# Patient Record
Sex: MALE | Race: WHITE | NOT HISPANIC OR LATINO | Employment: FULL TIME | URBAN - METROPOLITAN AREA
[De-identification: names, ages, dates, MRNs, and addresses within clinical notes are randomized per-mention and may not be internally consistent; named-entity substitution may affect disease eponyms.]

---

## 2017-05-01 ENCOUNTER — OFFICE VISIT (OUTPATIENT)
Dept: FAMILY MEDICINE CLINIC | Facility: CLINIC | Age: 52
End: 2017-05-01

## 2017-05-01 VITALS
TEMPERATURE: 97.7 F | HEART RATE: 72 BPM | DIASTOLIC BLOOD PRESSURE: 72 MMHG | OXYGEN SATURATION: 99 % | WEIGHT: 226.5 LBS | SYSTOLIC BLOOD PRESSURE: 102 MMHG | BODY MASS INDEX: 31.71 KG/M2 | HEIGHT: 71 IN

## 2017-05-01 DIAGNOSIS — Z00.00 HEALTH CARE MAINTENANCE: Primary | ICD-10-CM

## 2017-05-01 DIAGNOSIS — Z12.5 SCREENING PSA (PROSTATE SPECIFIC ANTIGEN): ICD-10-CM

## 2017-05-01 DIAGNOSIS — I10 ESSENTIAL HYPERTENSION: ICD-10-CM

## 2017-05-01 PROCEDURE — 99396 PREV VISIT EST AGE 40-64: CPT | Performed by: FAMILY MEDICINE

## 2017-05-01 PROCEDURE — 90715 TDAP VACCINE 7 YRS/> IM: CPT | Performed by: FAMILY MEDICINE

## 2017-05-01 PROCEDURE — 90471 IMMUNIZATION ADMIN: CPT | Performed by: FAMILY MEDICINE

## 2017-05-01 RX ORDER — LISINOPRIL 10 MG/1
10 TABLET ORAL DAILY
Qty: 90 TABLET | Refills: 3 | Status: SHIPPED | OUTPATIENT
Start: 2017-05-01 | End: 2017-10-30

## 2017-05-02 LAB
ALBUMIN SERPL-MCNC: 4.5 G/DL (ref 3.5–5.2)
ALBUMIN/GLOB SERPL: 1.7 G/DL
ALP SERPL-CCNC: 76 U/L (ref 39–117)
ALT SERPL-CCNC: 25 U/L (ref 1–41)
AST SERPL-CCNC: 22 U/L (ref 1–40)
BASOPHILS # BLD AUTO: 0.04 10*3/MM3 (ref 0–0.2)
BASOPHILS NFR BLD AUTO: 1.1 % (ref 0–1.5)
BILIRUB SERPL-MCNC: 1 MG/DL (ref 0.1–1.2)
BUN SERPL-MCNC: 20 MG/DL (ref 6–20)
BUN/CREAT SERPL: 16.3 (ref 7–25)
CALCIUM SERPL-MCNC: 9.8 MG/DL (ref 8.6–10.5)
CHLORIDE SERPL-SCNC: 102 MMOL/L (ref 98–107)
CHOLEST SERPL-MCNC: 191 MG/DL (ref 0–200)
CO2 SERPL-SCNC: 27.4 MMOL/L (ref 22–29)
CREAT SERPL-MCNC: 1.23 MG/DL (ref 0.76–1.27)
EOSINOPHIL # BLD AUTO: 0.14 10*3/MM3 (ref 0–0.7)
EOSINOPHIL NFR BLD AUTO: 3.7 % (ref 0.3–6.2)
ERYTHROCYTE [DISTWIDTH] IN BLOOD BY AUTOMATED COUNT: 14.1 % (ref 11.5–14.5)
GLOBULIN SER CALC-MCNC: 2.7 GM/DL
GLUCOSE SERPL-MCNC: 84 MG/DL (ref 65–99)
HBV SURFACE AB SER QL: REACTIVE
HCT VFR BLD AUTO: 45.9 % (ref 40.4–52.2)
HCV AB S/CO SERPL IA: 0.1 S/CO RATIO (ref 0–0.9)
HDLC SERPL-MCNC: 36 MG/DL (ref 40–60)
HGB BLD-MCNC: 15 G/DL (ref 13.7–17.6)
IMM GRANULOCYTES # BLD: 0 10*3/MM3 (ref 0–0.03)
IMM GRANULOCYTES NFR BLD: 0 % (ref 0–0.5)
LDLC SERPL CALC-MCNC: 131 MG/DL (ref 0–100)
LYMPHOCYTES # BLD AUTO: 1.37 10*3/MM3 (ref 0.9–4.8)
LYMPHOCYTES NFR BLD AUTO: 36.6 % (ref 19.6–45.3)
MCH RBC QN AUTO: 29.7 PG (ref 27–32.7)
MCHC RBC AUTO-ENTMCNC: 32.7 G/DL (ref 32.6–36.4)
MCV RBC AUTO: 90.9 FL (ref 79.8–96.2)
MONOCYTES # BLD AUTO: 0.29 10*3/MM3 (ref 0.2–1.2)
MONOCYTES NFR BLD AUTO: 7.8 % (ref 5–12)
NEUTROPHILS # BLD AUTO: 1.9 10*3/MM3 (ref 1.9–8.1)
NEUTROPHILS NFR BLD AUTO: 50.8 % (ref 42.7–76)
PLATELET # BLD AUTO: 132 10*3/MM3 (ref 140–500)
POTASSIUM SERPL-SCNC: 4.5 MMOL/L (ref 3.5–5.2)
PROT SERPL-MCNC: 7.2 G/DL (ref 6–8.5)
PSA SERPL-MCNC: 1.66 NG/ML (ref 0–4)
RBC # BLD AUTO: 5.05 10*6/MM3 (ref 4.6–6)
SODIUM SERPL-SCNC: 142 MMOL/L (ref 136–145)
TRIGL SERPL-MCNC: 120 MG/DL (ref 0–150)
VLDLC SERPL CALC-MCNC: 24 MG/DL (ref 5–40)
WBC # BLD AUTO: 3.74 10*3/MM3 (ref 4.5–10.7)

## 2017-10-26 DIAGNOSIS — I10 ESSENTIAL HYPERTENSION: ICD-10-CM

## 2017-10-26 DIAGNOSIS — E78.00 PURE HYPERCHOLESTEROLEMIA: Primary | ICD-10-CM

## 2017-10-30 RX ORDER — LISINOPRIL 10 MG/1
TABLET ORAL
Qty: 90 TABLET | Refills: 1 | Status: SHIPPED | OUTPATIENT
Start: 2017-10-30 | End: 2018-12-19 | Stop reason: SDUPTHER

## 2017-10-31 LAB
ALBUMIN SERPL-MCNC: 4.7 G/DL (ref 3.5–5.2)
ALBUMIN/GLOB SERPL: 2 G/DL
ALP SERPL-CCNC: 73 U/L (ref 39–117)
ALT SERPL-CCNC: 30 U/L (ref 1–41)
AST SERPL-CCNC: 25 U/L (ref 1–40)
BILIRUB SERPL-MCNC: 1.1 MG/DL (ref 0.1–1.2)
BUN SERPL-MCNC: 18 MG/DL (ref 6–20)
BUN/CREAT SERPL: 16.5 (ref 7–25)
CALCIUM SERPL-MCNC: 9.3 MG/DL (ref 8.6–10.5)
CHLORIDE SERPL-SCNC: 100 MMOL/L (ref 98–107)
CHOLEST SERPL-MCNC: 180 MG/DL (ref 0–200)
CO2 SERPL-SCNC: 26.9 MMOL/L (ref 22–29)
CREAT SERPL-MCNC: 1.09 MG/DL (ref 0.76–1.27)
GFR SERPLBLD CREATININE-BSD FMLA CKD-EPI: 72 ML/MIN/1.73
GFR SERPLBLD CREATININE-BSD FMLA CKD-EPI: 87 ML/MIN/1.73
GLOBULIN SER CALC-MCNC: 2.3 GM/DL
GLUCOSE SERPL-MCNC: 77 MG/DL (ref 65–99)
HDLC SERPL-MCNC: 41 MG/DL (ref 40–60)
LDLC SERPL CALC-MCNC: 121 MG/DL (ref 0–100)
LDLC/HDLC SERPL: 2.96 {RATIO}
POTASSIUM SERPL-SCNC: 4.4 MMOL/L (ref 3.5–5.2)
PROT SERPL-MCNC: 7 G/DL (ref 6–8.5)
SODIUM SERPL-SCNC: 142 MMOL/L (ref 136–145)
TRIGL SERPL-MCNC: 89 MG/DL (ref 0–150)
VLDLC SERPL CALC-MCNC: 17.8 MG/DL (ref 5–40)

## 2017-11-06 ENCOUNTER — OFFICE VISIT (OUTPATIENT)
Dept: FAMILY MEDICINE CLINIC | Facility: CLINIC | Age: 52
End: 2017-11-06

## 2017-11-06 VITALS
OXYGEN SATURATION: 98 % | HEART RATE: 76 BPM | HEIGHT: 71 IN | BODY MASS INDEX: 29.54 KG/M2 | DIASTOLIC BLOOD PRESSURE: 66 MMHG | WEIGHT: 211 LBS | TEMPERATURE: 97.8 F | SYSTOLIC BLOOD PRESSURE: 112 MMHG

## 2017-11-06 DIAGNOSIS — I10 ESSENTIAL HYPERTENSION: Primary | ICD-10-CM

## 2017-11-06 PROCEDURE — 99213 OFFICE O/P EST LOW 20 MIN: CPT | Performed by: FAMILY MEDICINE

## 2017-11-06 NOTE — PROGRESS NOTES
"Subjective   Jose Montanez is a 51 y.o. male.     Chief Complaint   Patient presents with   • Hypertension     follow up labs        History of Present Illness    Hypertension.  Follow-up.  Patient is doing great.  He's cut way back on carbohydrates and sugars.  He is losing weight.  Not exercising much.  Overall is lost about 25 pounds since last year.  He has had no hypotensive episodes.  No orthostasis.  He continues lisinopril 10 mg a day.  No dry cough.  His lipid panel has improved.  LDL is down.  Blood sugar is down.      The following portions of the patient's history were reviewed and updated as appropriate: allergies, current medications, past family history, past medical history, past social history, past surgical history and problem list.          Review of Systems   Constitutional: Negative.  Negative for unexpected weight change.   Respiratory: Negative.    Cardiovascular: Negative.    Musculoskeletal: Negative.    Neurological: Negative.    Psychiatric/Behavioral: Negative.        Objective   Blood pressure 112/66, pulse 76, temperature 97.8 °F (36.6 °C), temperature source Oral, height 71\" (180.3 cm), weight 211 lb (95.7 kg), SpO2 98 %.  Physical Exam   Constitutional: He appears well-developed and well-nourished. No distress.   Neck: No thyromegaly present.   Cardiovascular: Normal rate, regular rhythm, normal heart sounds and intact distal pulses.    Pulmonary/Chest: Effort normal and breath sounds normal.   Musculoskeletal: He exhibits no edema.   Skin: Skin is warm and dry.   Psychiatric: He has a normal mood and affect. His behavior is normal. Judgment and thought content normal.   Nursing note and vitals reviewed.      Assessment/Plan   Jose was seen today for hypertension.    Diagnoses and all orders for this visit:    Essential hypertension      Hypertension.  Well-controlled.  He's been losing weight.  For now continue lisinopril 10 mg a day.  His refills are up-to-date.  If he " starts having lower blood pressure symptoms, we will cut down to 5 mg a day.  Weight loss has been encouraged.  I do recommend increased exercise.    Discussed colorectal cancer screening.  He had a stool DNA test which came back negative reportedly.  We still discussed colonoscopy.  When he is ready for referral I can assist.    Follow-up in 6 months.  For clear physical examination.  Needs CMP, CBC, lipid panel, PSA prior to that visit

## 2018-12-20 RX ORDER — LISINOPRIL 10 MG/1
TABLET ORAL
Qty: 90 TABLET | Refills: 1 | Status: SHIPPED | OUTPATIENT
Start: 2018-12-20 | End: 2019-07-08 | Stop reason: SDUPTHER

## 2019-07-01 DIAGNOSIS — I10 ESSENTIAL HYPERTENSION: ICD-10-CM

## 2019-07-01 DIAGNOSIS — Z12.5 SCREENING FOR PROSTATE CANCER: ICD-10-CM

## 2019-07-01 DIAGNOSIS — Z00.00 HEALTH CARE MAINTENANCE: ICD-10-CM

## 2019-07-01 DIAGNOSIS — E78.00 PURE HYPERCHOLESTEROLEMIA: Primary | ICD-10-CM

## 2019-07-08 ENCOUNTER — OFFICE VISIT (OUTPATIENT)
Dept: FAMILY MEDICINE CLINIC | Facility: CLINIC | Age: 54
End: 2019-07-08

## 2019-07-08 VITALS
TEMPERATURE: 96.8 F | HEIGHT: 71 IN | BODY MASS INDEX: 23.03 KG/M2 | SYSTOLIC BLOOD PRESSURE: 143 MMHG | HEART RATE: 63 BPM | DIASTOLIC BLOOD PRESSURE: 103 MMHG | OXYGEN SATURATION: 97 % | WEIGHT: 164.5 LBS

## 2019-07-08 DIAGNOSIS — Z12.11 ENCOUNTER FOR SCREENING COLONOSCOPY: ICD-10-CM

## 2019-07-08 DIAGNOSIS — Z00.00 HEALTH CARE MAINTENANCE: Primary | ICD-10-CM

## 2019-07-08 PROCEDURE — 99396 PREV VISIT EST AGE 40-64: CPT | Performed by: FAMILY MEDICINE

## 2019-07-08 RX ORDER — RANITIDINE 300 MG/1
300 TABLET ORAL NIGHTLY
COMMUNITY
End: 2021-06-07

## 2019-07-08 RX ORDER — LISINOPRIL 10 MG/1
10 TABLET ORAL DAILY
Qty: 90 TABLET | Refills: 3 | Status: SHIPPED | OUTPATIENT
Start: 2019-07-08 | End: 2020-07-27 | Stop reason: SDUPTHER

## 2019-07-08 NOTE — PROGRESS NOTES
Subjective   Jose Montanez is a 53 y.o. male.     Annual Exam    History of Present Illness    Hypertension follow up.  Today's BP: (!) 143/103.  Patient has been checking his blood pressure at home regularly.  About 120 systolic on average.  He ran out of his lisinopril a few days ago.  Blood pressure is elevated today and he ran up here.    He is exercising about once a week on his exercise bicycle.  Minimal alcohol use, maybe once a week.  No drug use.  Negative depression screen.    He has a inguinal hernia for some time.  He said about having it worked on.  He needs a screening colonoscopy.  He had a negative Cologuard a couple of years ago.  There is no family history of colon cancer.  His father has coronary artery disease medication management.  The patient's ten-year risk of atherosclerotic coronary vascular disease is approximately 5%.    Last lipid panel:   Lab Results   Component Value Date    HDL 41 10/30/2017     Lab Results   Component Value Date     (H) 10/30/2017     Lab Results   Component Value Date    TRIG 89 10/30/2017     Social History     Tobacco Use   • Smoking status: Never Smoker   • Smokeless tobacco: Never Used   Substance Use Topics   • Alcohol use: Yes   • Drug use: No     Immunization History   Administered Date(s) Administered   • Tdap 05/01/2017     Family History   Problem Relation Age of Onset   • Arthritis Mother    • Diverticulitis Mother    • Hypertension Father    • Coronary artery disease Father         med managment       The following portions of the patient's history were reviewed and updated as appropriate: allergies, current medications, past family history, past medical history, past social history, past surgical history and problem list.      Review of Systems   Constitutional: Negative.    HENT: Negative.    Respiratory: Negative.    Cardiovascular: Negative.    Gastrointestinal: Negative.  Negative for blood in stool.   Endocrine: Negative.   "  Genitourinary: Negative.  Negative for hematuria.   Musculoskeletal: Negative.    Skin: Negative.    Neurological: Negative.  Negative for headaches.   Psychiatric/Behavioral: Negative.    All other systems reviewed and are negative.      Objective   Blood pressure (!) 143/103, pulse 63, temperature 96.8 °F (36 °C), temperature source Oral, height 180.3 cm (70.98\"), weight 74.6 kg (164 lb 8 oz), SpO2 97 %.  Physical Exam   Constitutional: He is oriented to person, place, and time. He appears well-developed and well-nourished. No distress.   HENT:   Head: Atraumatic.   Right Ear: Tympanic membrane, external ear and ear canal normal.   Left Ear: Tympanic membrane, external ear and ear canal normal.   Nose: Nose normal.   Mouth/Throat: Oropharynx is clear and moist. No oropharyngeal exudate.   Eyes: Conjunctivae are normal. Right eye exhibits no discharge. Left eye exhibits no discharge. No scleral icterus.   Neck: Normal range of motion. Neck supple. No thyromegaly present.   Cardiovascular: Normal rate, regular rhythm, normal heart sounds and intact distal pulses.   Pulmonary/Chest: Effort normal and breath sounds normal. No respiratory distress. He has no wheezes. He has no rales.   Abdominal: Soft. Bowel sounds are normal. He exhibits no distension. There is no tenderness.   Musculoskeletal: He exhibits no edema.   Lymphadenopathy:     He has no cervical adenopathy.   Neurological: He is alert and oriented to person, place, and time. He exhibits normal muscle tone. Coordination normal.   Skin: Skin is warm and dry. No rash noted.   Psychiatric: He has a normal mood and affect. His behavior is normal. Judgment and thought content normal.   Nursing note and vitals reviewed.      Assessment/Plan   Jose was seen today for annual exam.    Diagnoses and all orders for this visit:    Health care maintenance    Encounter for screening colonoscopy  -     Ambulatory Referral For Screening Colonoscopy    Other " orders  -     lisinopril (PRINIVIL,ZESTRIL) 10 MG tablet; Take 1 tablet by mouth Daily.      Annual wellness visit.    Immunizations.  Tdap up-to-date.    Colon cancer screening.  I recommend a screening colonoscopy.  He had a negative Cologuard a couple of years ago.  Benefits and risk of colonoscopy discussed.    Prostate cancer screening.  Check PSA.  He has a doctor who checks his prostate.    Hypertension.  Good control at home.  Continue lisinopril.  I refilled it.  It is high today because he has not been taking his medicine for a few days.  He will continue to monitor his blood pressure at home, he states he checks daily.    Health maintenance and safety issues discussed.  I recommend regular exercise.  We discussed mindfulness meditation.  We discussed dieting.  Recommend weight watchers.  I will see him back in 1 year, sooner as needed.  Lab work ordered today.    Cardiovascular risk screening.  Based on 2017 his ten-year risk of coronary artery disease is approximately 5%.  If his risk goes up, to consider CT CAC scoring if he is of intermediate risk.  If at high risk recommend statin use.

## 2019-07-09 DIAGNOSIS — E78.00 PURE HYPERCHOLESTEROLEMIA: Primary | ICD-10-CM

## 2019-07-09 LAB
ALBUMIN SERPL-MCNC: 4.5 G/DL (ref 3.5–5.2)
ALBUMIN/GLOB SERPL: 2 G/DL
ALP SERPL-CCNC: 73 U/L (ref 39–117)
ALT SERPL-CCNC: 22 U/L (ref 1–41)
APPEARANCE UR: CLEAR
AST SERPL-CCNC: 22 U/L (ref 1–40)
BASOPHILS # BLD AUTO: 0.05 10*3/MM3 (ref 0–0.2)
BASOPHILS NFR BLD AUTO: 1 % (ref 0–1.5)
BILIRUB SERPL-MCNC: 1 MG/DL (ref 0.2–1.2)
BILIRUB UR QL STRIP: NEGATIVE
BUN SERPL-MCNC: 23 MG/DL (ref 6–20)
BUN/CREAT SERPL: 18.3 (ref 7–25)
CALCIUM SERPL-MCNC: 9.4 MG/DL (ref 8.6–10.5)
CHLORIDE SERPL-SCNC: 101 MMOL/L (ref 98–107)
CHOLEST SERPL-MCNC: 202 MG/DL (ref 0–200)
CO2 SERPL-SCNC: 28.6 MMOL/L (ref 22–29)
COLOR UR: YELLOW
CREAT SERPL-MCNC: 1.26 MG/DL (ref 0.76–1.27)
EOSINOPHIL # BLD AUTO: 0.27 10*3/MM3 (ref 0–0.4)
EOSINOPHIL NFR BLD AUTO: 5.6 % (ref 0.3–6.2)
ERYTHROCYTE [DISTWIDTH] IN BLOOD BY AUTOMATED COUNT: 13.2 % (ref 12.3–15.4)
GLOBULIN SER CALC-MCNC: 2.3 GM/DL
GLUCOSE SERPL-MCNC: 84 MG/DL (ref 65–99)
GLUCOSE UR QL: NEGATIVE
HCT VFR BLD AUTO: 49.9 % (ref 37.5–51)
HDLC SERPL-MCNC: 44 MG/DL (ref 40–60)
HGB BLD-MCNC: 15.9 G/DL (ref 13–17.7)
HGB UR QL STRIP: NEGATIVE
IMM GRANULOCYTES # BLD AUTO: 0.05 10*3/MM3 (ref 0–0.05)
IMM GRANULOCYTES NFR BLD AUTO: 1 % (ref 0–0.5)
KETONES UR QL STRIP: NEGATIVE
LDLC SERPL CALC-MCNC: 143 MG/DL (ref 0–100)
LEUKOCYTE ESTERASE UR QL STRIP: NEGATIVE
LYMPHOCYTES # BLD AUTO: 1.29 10*3/MM3 (ref 0.7–3.1)
LYMPHOCYTES NFR BLD AUTO: 27 % (ref 19.6–45.3)
MCH RBC QN AUTO: 28.6 PG (ref 26.6–33)
MCHC RBC AUTO-ENTMCNC: 31.9 G/DL (ref 31.5–35.7)
MCV RBC AUTO: 89.9 FL (ref 79–97)
MONOCYTES # BLD AUTO: 0.44 10*3/MM3 (ref 0.1–0.9)
MONOCYTES NFR BLD AUTO: 9.2 % (ref 5–12)
NEUTROPHILS # BLD AUTO: 2.68 10*3/MM3 (ref 1.7–7)
NEUTROPHILS NFR BLD AUTO: 56.2 % (ref 42.7–76)
NITRITE UR QL STRIP: NEGATIVE
NRBC BLD AUTO-RTO: 0 /100 WBC (ref 0–0.2)
PH UR STRIP: 6 [PH] (ref 5–8)
PLATELET # BLD AUTO: 139 10*3/MM3 (ref 140–450)
POTASSIUM SERPL-SCNC: 4.6 MMOL/L (ref 3.5–5.2)
PROT SERPL-MCNC: 6.8 G/DL (ref 6–8.5)
PROT UR QL STRIP: NEGATIVE
PSA SERPL-MCNC: 1.53 NG/ML (ref 0–4)
RBC # BLD AUTO: 5.55 10*6/MM3 (ref 4.14–5.8)
SODIUM SERPL-SCNC: 140 MMOL/L (ref 136–145)
SP GR UR: 1.02 (ref 1–1.03)
TRIGL SERPL-MCNC: 75 MG/DL (ref 0–150)
UROBILINOGEN UR STRIP-MCNC: NORMAL MG/DL
VLDLC SERPL CALC-MCNC: 15 MG/DL
WBC # BLD AUTO: 4.78 10*3/MM3 (ref 3.4–10.8)

## 2019-07-09 NOTE — PROGRESS NOTES
Please call patient.  The LDL cholesterol is higher.  The ten-year risk of coronary artery disease is 7 % or just at the intermediate risk.  Per my conversation with him yesterday, I recommend a CT coronary artery calcium score.  I have ordered it.  The remainder of the blood work looks good including the PSA level

## 2019-07-10 ENCOUNTER — HOSPITAL ENCOUNTER (OUTPATIENT)
Dept: CT IMAGING | Facility: HOSPITAL | Age: 54
Discharge: HOME | End: 2019-07-10
Admitting: FAMILY MEDICINE

## 2019-07-10 DIAGNOSIS — E78.00 PURE HYPERCHOLESTEROLEMIA: ICD-10-CM

## 2019-07-10 PROCEDURE — 75571 CT HRT W/O DYE W/CA TEST: CPT

## 2019-07-12 NOTE — PROGRESS NOTES
Please call patient let him know the CT CAC score was 0.  No evidence of atherosclerosis.  No need for medication.

## 2019-12-05 ENCOUNTER — TELEPHONE (OUTPATIENT)
Dept: CARDIOLOGY | Facility: CLINIC | Age: 54
End: 2019-12-05

## 2019-12-05 NOTE — TELEPHONE ENCOUNTER
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  He is wanting to be established with Dr Anthony again.  Patient has not seen Dr Anthony in 10+ years.  Is it okay to put him on the schedule and if so where.  Thank you,  Janae

## 2019-12-09 NOTE — TELEPHONE ENCOUNTER
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  He can do Monday mornings, Thursday or Friday afternoon's after 1:00 pm or so.  Please advise.  Thank you,  Janae

## 2019-12-09 NOTE — TELEPHONE ENCOUNTER
Janae I called the patient can you add him on as a New Patient on 1/3/2020 at 3:40  Been 10+ years since he has been here.

## 2019-12-28 ENCOUNTER — HOSPITAL ENCOUNTER (OUTPATIENT)
Dept: GENERAL RADIOLOGY | Facility: HOSPITAL | Age: 54
Discharge: HOME | End: 2019-12-28
Admitting: UROLOGY

## 2019-12-28 DIAGNOSIS — M79.671 HEEL PAIN, CHRONIC, RIGHT: ICD-10-CM

## 2019-12-28 DIAGNOSIS — G89.29 HEEL PAIN, CHRONIC, RIGHT: ICD-10-CM

## 2019-12-28 DIAGNOSIS — M79.671 HEEL PAIN, CHRONIC, RIGHT: Primary | ICD-10-CM

## 2019-12-28 DIAGNOSIS — G89.29 HEEL PAIN, CHRONIC, RIGHT: Primary | ICD-10-CM

## 2019-12-28 PROCEDURE — 73620 X-RAY EXAM OF FOOT: CPT

## 2019-12-28 PROCEDURE — 73630 X-RAY EXAM OF FOOT: CPT

## 2020-01-29 ENCOUNTER — OFFICE VISIT (OUTPATIENT)
Dept: CARDIOLOGY | Facility: CLINIC | Age: 55
End: 2020-01-29

## 2020-01-29 VITALS
BODY MASS INDEX: 32.47 KG/M2 | SYSTOLIC BLOOD PRESSURE: 138 MMHG | WEIGHT: 226.8 LBS | HEIGHT: 70 IN | HEART RATE: 78 BPM | DIASTOLIC BLOOD PRESSURE: 86 MMHG

## 2020-01-29 DIAGNOSIS — I10 ESSENTIAL HYPERTENSION: Primary | ICD-10-CM

## 2020-01-29 DIAGNOSIS — I49.3 PVC (PREMATURE VENTRICULAR CONTRACTION): ICD-10-CM

## 2020-01-29 DIAGNOSIS — E66.09 CLASS 1 OBESITY DUE TO EXCESS CALORIES WITHOUT SERIOUS COMORBIDITY WITH BODY MASS INDEX (BMI) OF 32.0 TO 32.9 IN ADULT: ICD-10-CM

## 2020-01-29 PROCEDURE — 93000 ELECTROCARDIOGRAM COMPLETE: CPT | Performed by: INTERNAL MEDICINE

## 2020-01-29 PROCEDURE — 99203 OFFICE O/P NEW LOW 30 MIN: CPT | Performed by: INTERNAL MEDICINE

## 2020-01-29 NOTE — PROGRESS NOTES
Date of Office Visit: 20  Encounter Provider: Jose C Anthony MD  Place of Service: [facility] CARDIOLOGY  Patient Name: Jose Montanez  :1965  [id number]    Chief Complaint   Patient presents with   • Establish Care   :     HPI: Jose Montanez is a 54 y.o. male he is here to see us for PVCs.  He had a ablation on a vein in his leg and was on a heart monitor and noticed he was having PVCs he does not really feel but he wanted to come get checked out.  He has not had any chest pain shortness of breath PND orthopnea or edema.  He saw his primary care doctor did a calcium score on him that was 0.  He does have a history of hypertension he is got some allergies and he takes Zyrtec with Sudafed.  He has sleep apnea and is not been wearing his mask.  He is able to exercise but he is a bad knee but he rides his bike and feels well while he is doing that without symptoms.  There is no history of cardiomyopathies or sudden death in the family.  He rarely drinks alcohol he does drink pretty big caffeine about 3 times a week    Past Medical History:   Diagnosis Date   • Allergic    • Hypertension        Past Surgical History:   Procedure Laterality Date   • ADENOIDECTOMY     • BACK SURGERY     • SEPTOPLASTY     • TONSILLECTOMY         Social History     Socioeconomic History   • Marital status:      Spouse name: Not on file   • Number of children: Not on file   • Years of education: Not on file   • Highest education level: Not on file   Tobacco Use   • Smoking status: Never Smoker   • Smokeless tobacco: Never Used   Substance and Sexual Activity   • Alcohol use: Yes   • Drug use: No       Family History   Problem Relation Age of Onset   • Arthritis Mother    • Diverticulitis Mother    • Hypertension Father    • Coronary artery disease Father         med managment       Review of Systems   Constitution: Negative for decreased appetite, fever, malaise/fatigue and  "weight loss.   HENT: Negative for nosebleeds.    Eyes: Negative for double vision.   Cardiovascular: Negative for chest pain, claudication, cyanosis, dyspnea on exertion, irregular heartbeat, leg swelling, near-syncope, orthopnea, palpitations, paroxysmal nocturnal dyspnea and syncope.   Respiratory: Negative for cough, hemoptysis and shortness of breath.    Hematologic/Lymphatic: Negative for bleeding problem.   Skin: Negative for rash.   Musculoskeletal: Negative for falls and myalgias.   Gastrointestinal: Negative for hematochezia, jaundice, melena, nausea and vomiting.   Genitourinary: Negative for hematuria.   Neurological: Negative for dizziness and seizures.   Psychiatric/Behavioral: Negative for altered mental status and memory loss.       No Known Allergies      Current Outpatient Medications:   •  azelastine (OPTIVAR) 0.05 % ophthalmic solution, Apply  to eye., Disp: , Rfl:   •  cetirizine-pseudoephedrine (ZYRTEC-D ALLERGY & CONGESTION) 5-120 MG per 12 hr tablet, Take  by mouth., Disp: , Rfl:   •  lisinopril (PRINIVIL,ZESTRIL) 10 MG tablet, Take 1 tablet by mouth Daily., Disp: 90 tablet, Rfl: 3  •  Multiple Vitamins-Minerals (MULTIVITAL PO), Take 1 tablet by mouth Daily., Disp: , Rfl:   •  raNITIdine (ZANTAC) 300 MG tablet, Take 300 mg by mouth Every Night., Disp: , Rfl:       Objective:     Vitals:    01/29/20 0929 01/29/20 0932   BP: 140/90 138/86   BP Location: Right arm Left arm   Patient Position: Sitting Sitting   Pulse: 78    Weight: 103 kg (226 lb 12.8 oz)    Height: 177.8 cm (70\")      Body mass index is 32.54 kg/m².    Physical Exam   Constitutional: He is oriented to person, place, and time. He appears well-developed and well-nourished.   HENT:   Head: Normocephalic.   Eyes: No scleral icterus.   Neck: No JVD present. No thyromegaly present.   Cardiovascular: Normal rate, regular rhythm and normal heart sounds. Exam reveals no gallop and no friction rub.   No murmur heard.  Pulmonary/Chest: " Effort normal and breath sounds normal. He has no wheezes. He has no rales.   Abdominal: Soft. There is no hepatosplenomegaly. There is no tenderness.   Musculoskeletal: Normal range of motion. He exhibits no edema.   Lymphadenopathy:     He has no cervical adenopathy.   Neurological: He is alert and oriented to person, place, and time.   Skin: Skin is warm and dry. No rash noted.   Psychiatric: He has a normal mood and affect.         ECG 12 Lead  Date/Time: 1/29/2020 10:06 AM  Performed by: Jose C Anthony MD  Authorized by: Joes C Anthony MD   Comparison: compared with previous ECG   Similar to previous ECG  Rhythm: sinus rhythm  Ectopy: unifocal PVCs    Clinical impression: abnormal EKG             Assessment:       Diagnosis Plan   1. Essential hypertension     2. PVC (premature ventricular contraction)     3. Class 1 obesity due to excess calories without serious comorbidity with body mass index (BMI) of 32.0 to 32.9 in adult            Plan:       So I think that it is I doubt there is anything sinister going on but he does have PVCs I think it warrants a little bit of an evaluation I like to see how many he is having during the day and I would also like to get an echo on him to make sure that his underlying structural heart is good and that he has not developed a cardiomyopathy I do not think he has.  I have counseled him to drop his Sudafed from his regimen and see if that helps and also to get his sleep apnea treated and see if that helps I am not inclined to treat him unless he is having thousands and thousands of PVCs or having an issue with his LV function.  I spent a long time counseling him on diet I think he needs to bring his weight down    As always, it has been a pleasure to participate in your patient's care.      Sincerely,       Jose C Anthony MD

## 2020-02-20 ENCOUNTER — HOSPITAL ENCOUNTER (OUTPATIENT)
Dept: CARDIOLOGY | Facility: HOSPITAL | Age: 55
Discharge: HOME | End: 2020-02-20
Admitting: INTERNAL MEDICINE

## 2020-02-20 VITALS
BODY MASS INDEX: 32.35 KG/M2 | HEART RATE: 78 BPM | HEIGHT: 70 IN | SYSTOLIC BLOOD PRESSURE: 143 MMHG | WEIGHT: 226 LBS | DIASTOLIC BLOOD PRESSURE: 92 MMHG | OXYGEN SATURATION: 98 %

## 2020-02-20 DIAGNOSIS — I10 ESSENTIAL HYPERTENSION: ICD-10-CM

## 2020-02-20 DIAGNOSIS — I49.3 PVC (PREMATURE VENTRICULAR CONTRACTION): ICD-10-CM

## 2020-02-20 DIAGNOSIS — E66.09 CLASS 1 OBESITY DUE TO EXCESS CALORIES WITHOUT SERIOUS COMORBIDITY WITH BODY MASS INDEX (BMI) OF 32.0 TO 32.9 IN ADULT: ICD-10-CM

## 2020-02-20 LAB
AORTIC ARCH: 3 CM
AORTIC ROOT ANNULUS: 2.2 CM
ASCENDING AORTA: 3.2 CM
BH CV ECHO MEAS - ACS: 2.2 CM
BH CV ECHO MEAS - AO MAX PG (FULL): 6.4 MMHG
BH CV ECHO MEAS - AO MAX PG: 10 MMHG
BH CV ECHO MEAS - AO MEAN PG (FULL): 4 MMHG
BH CV ECHO MEAS - AO MEAN PG: 6 MMHG
BH CV ECHO MEAS - AO ROOT AREA (BSA CORRECTED): 1.5
BH CV ECHO MEAS - AO ROOT AREA: 9.1 CM^2
BH CV ECHO MEAS - AO ROOT DIAM: 3.4 CM
BH CV ECHO MEAS - AO V2 MAX: 158 CM/SEC
BH CV ECHO MEAS - AO V2 MEAN: 118 CM/SEC
BH CV ECHO MEAS - AO V2 VTI: 33.9 CM
BH CV ECHO MEAS - ASC AORTA: 3.2 CM
BH CV ECHO MEAS - AVA(I,A): 2.1 CM^2
BH CV ECHO MEAS - AVA(I,D): 2.1 CM^2
BH CV ECHO MEAS - AVA(V,A): 2.1 CM^2
BH CV ECHO MEAS - AVA(V,D): 2.1 CM^2
BH CV ECHO MEAS - BSA(HAYCOCK): 2.3 M^2
BH CV ECHO MEAS - BSA: 2.2 M^2
BH CV ECHO MEAS - BZI_BMI: 32.4 KILOGRAMS/M^2
BH CV ECHO MEAS - BZI_METRIC_HEIGHT: 177.8 CM
BH CV ECHO MEAS - BZI_METRIC_WEIGHT: 102.5 KG
BH CV ECHO MEAS - EDV(MOD-SP2): 87 ML
BH CV ECHO MEAS - EDV(MOD-SP4): 90 ML
BH CV ECHO MEAS - EDV(TEICH): 129.5 ML
BH CV ECHO MEAS - EF(CUBED): 66.8 %
BH CV ECHO MEAS - EF(MOD-BP): 60 %
BH CV ECHO MEAS - EF(MOD-SP2): 66.7 %
BH CV ECHO MEAS - EF(MOD-SP4): 55.6 %
BH CV ECHO MEAS - EF(TEICH): 58 %
BH CV ECHO MEAS - ESV(MOD-SP2): 29 ML
BH CV ECHO MEAS - ESV(MOD-SP4): 40 ML
BH CV ECHO MEAS - ESV(TEICH): 54.4 ML
BH CV ECHO MEAS - FS: 30.8 %
BH CV ECHO MEAS - IVS/LVPW: 1.1
BH CV ECHO MEAS - IVSD: 1 CM
BH CV ECHO MEAS - LAT PEAK E' VEL: 8 CM/SEC
BH CV ECHO MEAS - LV DIASTOLIC VOL/BSA (35-75): 40.9 ML/M^2
BH CV ECHO MEAS - LV MASS(C)D: 181.4 GRAMS
BH CV ECHO MEAS - LV MASS(C)DI: 82.5 GRAMS/M^2
BH CV ECHO MEAS - LV MAX PG: 3.6 MMHG
BH CV ECHO MEAS - LV MEAN PG: 2 MMHG
BH CV ECHO MEAS - LV SYSTOLIC VOL/BSA (12-30): 18.2 ML/M^2
BH CV ECHO MEAS - LV V1 MAX: 94.8 CM/SEC
BH CV ECHO MEAS - LV V1 MEAN: 75.5 CM/SEC
BH CV ECHO MEAS - LV V1 VTI: 21 CM
BH CV ECHO MEAS - LVIDD: 5.2 CM
BH CV ECHO MEAS - LVIDS: 3.6 CM
BH CV ECHO MEAS - LVLD AP2: 7.9 CM
BH CV ECHO MEAS - LVLD AP4: 7.9 CM
BH CV ECHO MEAS - LVLS AP2: 5.9 CM
BH CV ECHO MEAS - LVLS AP4: 6.2 CM
BH CV ECHO MEAS - LVOT AREA (M): 3.5 CM^2
BH CV ECHO MEAS - LVOT AREA: 3.5 CM^2
BH CV ECHO MEAS - LVOT DIAM: 2.1 CM
BH CV ECHO MEAS - LVPWD: 0.9 CM
BH CV ECHO MEAS - MED PEAK E' VEL: 8 CM/SEC
BH CV ECHO MEAS - MV A DUR: 0.07 SEC
BH CV ECHO MEAS - MV A MAX VEL: 72.2 CM/SEC
BH CV ECHO MEAS - MV DEC SLOPE: 685 CM/SEC^2
BH CV ECHO MEAS - MV DEC TIME: 0.11 SEC
BH CV ECHO MEAS - MV E MAX VEL: 63.4 CM/SEC
BH CV ECHO MEAS - MV E/A: 0.88
BH CV ECHO MEAS - MV MAX PG: 2.5 MMHG
BH CV ECHO MEAS - MV MEAN PG: 2 MMHG
BH CV ECHO MEAS - MV P1/2T MAX VEL: 84.4 CM/SEC
BH CV ECHO MEAS - MV P1/2T: 36.1 MSEC
BH CV ECHO MEAS - MV V2 MAX: 79.7 CM/SEC
BH CV ECHO MEAS - MV V2 MEAN: 61.1 CM/SEC
BH CV ECHO MEAS - MV V2 VTI: 21.6 CM
BH CV ECHO MEAS - MVA P1/2T LCG: 2.6 CM^2
BH CV ECHO MEAS - MVA(P1/2T): 6.1 CM^2
BH CV ECHO MEAS - MVA(VTI): 3.4 CM^2
BH CV ECHO MEAS - PA ACC TIME: 0.06 SEC
BH CV ECHO MEAS - PA MAX PG (FULL): 2.9 MMHG
BH CV ECHO MEAS - PA MAX PG: 4.8 MMHG
BH CV ECHO MEAS - PA PR(ACCEL): 51.6 MMHG
BH CV ECHO MEAS - PA V2 MAX: 110 CM/SEC
BH CV ECHO MEAS - PULM A REVS DUR: 0.08 SEC
BH CV ECHO MEAS - PULM A REVS VEL: 34.3 CM/SEC
BH CV ECHO MEAS - PULM DIAS VEL: 54.8 CM/SEC
BH CV ECHO MEAS - PULM S/D: 1.1
BH CV ECHO MEAS - PULM SYS VEL: 60 CM/SEC
BH CV ECHO MEAS - PVA(V,A): 3.1 CM^2
BH CV ECHO MEAS - PVA(V,D): 3.1 CM^2
BH CV ECHO MEAS - QP/QS: 1.2
BH CV ECHO MEAS - RAP SYSTOLE: 3 MMHG
BH CV ECHO MEAS - RV MAX PG: 1.9 MMHG
BH CV ECHO MEAS - RV MEAN PG: 1 MMHG
BH CV ECHO MEAS - RV V1 MAX: 69.8 CM/SEC
BH CV ECHO MEAS - RV V1 MEAN: 52.2 CM/SEC
BH CV ECHO MEAS - RV V1 VTI: 17.5 CM
BH CV ECHO MEAS - RVOT AREA: 4.9 CM^2
BH CV ECHO MEAS - RVOT DIAM: 2.5 CM
BH CV ECHO MEAS - RVSP: 22 MMHG
BH CV ECHO MEAS - SI(AO): 140 ML/M^2
BH CV ECHO MEAS - SI(CUBED): 42.7 ML/M^2
BH CV ECHO MEAS - SI(LVOT): 33.1 ML/M^2
BH CV ECHO MEAS - SI(MOD-SP2): 26.4 ML/M^2
BH CV ECHO MEAS - SI(MOD-SP4): 22.7 ML/M^2
BH CV ECHO MEAS - SI(TEICH): 34.1 ML/M^2
BH CV ECHO MEAS - SUP REN AO DIAM: 2.8 CM
BH CV ECHO MEAS - SV(AO): 307.8 ML
BH CV ECHO MEAS - SV(CUBED): 94 ML
BH CV ECHO MEAS - SV(LVOT): 72.7 ML
BH CV ECHO MEAS - SV(MOD-SP2): 58 ML
BH CV ECHO MEAS - SV(MOD-SP4): 50 ML
BH CV ECHO MEAS - SV(RVOT): 85.9 ML
BH CV ECHO MEAS - SV(TEICH): 75.1 ML
BH CV ECHO MEAS - TAPSE (>1.6): 2.4 CM2
BH CV ECHO MEAS - TR MAX VEL: 216 CM/SEC
BH CV ECHO MEASUREMENTS AVERAGE E/E' RATIO: 7.93
BH CV VAS BP RIGHT ARM: NORMAL MMHG
BH CV XLRA - RV BASE: 3.3 CM
BH CV XLRA - RV LENGTH: 7 CM
BH CV XLRA - RV MID: 3 CM
BH CV XLRA - TDI S': 11 CM/SEC
LEFT ATRIUM VOLUME INDEX: 26 ML/M2
LV EF 2D ECHO EST: 60 %
MAXIMAL PREDICTED HEART RATE: 167 BPM
SINUS: 3.2 CM
STJ: 3 CM
STRESS TARGET HR: 142 BPM

## 2020-02-20 PROCEDURE — 93306 TTE W/DOPPLER COMPLETE: CPT | Performed by: INTERNAL MEDICINE

## 2020-02-20 PROCEDURE — 93306 TTE W/DOPPLER COMPLETE: CPT

## 2020-02-26 ENCOUNTER — TELEPHONE (OUTPATIENT)
Dept: CARDIOLOGY | Facility: CLINIC | Age: 55
End: 2020-02-26

## 2020-02-26 DIAGNOSIS — I49.3 PVC (PREMATURE VENTRICULAR CONTRACTION): Primary | ICD-10-CM

## 2020-02-26 NOTE — TELEPHONE ENCOUNTER
----- Message from Jose C Anthony MD sent at 2/26/2020  3:15 PM EST -----  Can you get him an appointment with me in 6 months.  I called and told him the results of the findings

## 2020-02-26 NOTE — TELEPHONE ENCOUNTER
Janae can you call this patient and get him an appt.  You could add him on for 8/26/20 at 9:40  Thank You  Migdalia

## 2020-03-09 ENCOUNTER — HOSPITAL ENCOUNTER (OUTPATIENT)
Dept: CARDIOLOGY | Facility: HOSPITAL | Age: 55
Discharge: HOME | End: 2020-03-09
Admitting: INTERNAL MEDICINE

## 2020-03-09 ENCOUNTER — LAB (OUTPATIENT)
Dept: LAB | Facility: HOSPITAL | Age: 55
End: 2020-03-09

## 2020-03-09 ENCOUNTER — APPOINTMENT (OUTPATIENT)
Dept: LAB | Facility: HOSPITAL | Age: 55
End: 2020-03-09

## 2020-03-09 DIAGNOSIS — E29.1 HYPOGONADISM MALE: Primary | ICD-10-CM

## 2020-03-09 DIAGNOSIS — E29.1 HYPOGONADISM MALE: ICD-10-CM

## 2020-03-09 DIAGNOSIS — I49.3 PVC (PREMATURE VENTRICULAR CONTRACTION): ICD-10-CM

## 2020-03-09 LAB
BH CV STRESS BP STAGE 1: NORMAL
BH CV STRESS BP STAGE 2: NORMAL
BH CV STRESS BP STAGE 3: NORMAL
BH CV STRESS BP STAGE 4: NORMAL
BH CV STRESS DURATION MIN STAGE 1: 3
BH CV STRESS DURATION MIN STAGE 2: 3
BH CV STRESS DURATION MIN STAGE 3: 3
BH CV STRESS DURATION MIN STAGE 4: 3
BH CV STRESS DURATION SEC STAGE 1: 0
BH CV STRESS DURATION SEC STAGE 2: 0
BH CV STRESS DURATION SEC STAGE 3: 0
BH CV STRESS DURATION SEC STAGE 4: 0
BH CV STRESS GRADE STAGE 1: 10
BH CV STRESS GRADE STAGE 2: 12
BH CV STRESS GRADE STAGE 3: 14
BH CV STRESS GRADE STAGE 4: 16
BH CV STRESS HR STAGE 1: 113
BH CV STRESS HR STAGE 2: 125
BH CV STRESS HR STAGE 3: 162
BH CV STRESS HR STAGE 4: 190
BH CV STRESS METS STAGE 1: 5
BH CV STRESS METS STAGE 2: 7.5
BH CV STRESS METS STAGE 3: 10
BH CV STRESS METS STAGE 4: 13.5
BH CV STRESS PROTOCOL 1: NORMAL
BH CV STRESS RECOVERY BP: NORMAL MMHG
BH CV STRESS RECOVERY HR: 104 BPM
BH CV STRESS SPEED STAGE 1: 1.7
BH CV STRESS SPEED STAGE 2: 2.5
BH CV STRESS SPEED STAGE 3: 3.4
BH CV STRESS SPEED STAGE 4: 4.2
BH CV STRESS STAGE 1: 1
BH CV STRESS STAGE 2: 2
BH CV STRESS STAGE 3: 3
BH CV STRESS STAGE 4: 4
ESTRADIOL SERPL HS-MCNC: 30.6 PG/ML
LH SERPL-ACNC: 4.35 MIU/ML
MAXIMAL PREDICTED HEART RATE: 167 BPM
PERCENT MAX PREDICTED HR: 113.77 %
PROLACTIN SERPL-MCNC: 8.81 NG/ML (ref 4.04–15.2)
STRESS BASELINE BP: NORMAL MMHG
STRESS BASELINE HR: 83 BPM
STRESS PERCENT HR: 134 %
STRESS POST ESTIMATED WORKLOAD: 13.5 METS
STRESS POST EXERCISE DUR MIN: 12 MIN
STRESS POST EXERCISE DUR SEC: 0 SEC
STRESS POST PEAK BP: NORMAL MMHG
STRESS POST PEAK HR: 190 BPM
STRESS TARGET HR: 142 BPM
T4 SERPL-MCNC: 7.31 MCG/DL (ref 4.5–11.7)
TSH SERPL DL<=0.05 MIU/L-ACNC: 1.91 UIU/ML (ref 0.27–4.2)

## 2020-03-09 PROCEDURE — 93018 CV STRESS TEST I&R ONLY: CPT | Performed by: INTERNAL MEDICINE

## 2020-03-09 PROCEDURE — 84146 ASSAY OF PROLACTIN: CPT

## 2020-03-09 PROCEDURE — 93016 CV STRESS TEST SUPVJ ONLY: CPT | Performed by: INTERNAL MEDICINE

## 2020-03-09 PROCEDURE — 84443 ASSAY THYROID STIM HORMONE: CPT

## 2020-03-09 PROCEDURE — 84436 ASSAY OF TOTAL THYROXINE: CPT

## 2020-03-09 PROCEDURE — 84403 ASSAY OF TOTAL TESTOSTERONE: CPT

## 2020-03-09 PROCEDURE — 93017 CV STRESS TEST TRACING ONLY: CPT

## 2020-03-09 PROCEDURE — 36415 COLL VENOUS BLD VENIPUNCTURE: CPT

## 2020-03-09 PROCEDURE — 84402 ASSAY OF FREE TESTOSTERONE: CPT

## 2020-03-09 PROCEDURE — 82670 ASSAY OF TOTAL ESTRADIOL: CPT

## 2020-03-09 PROCEDURE — 83002 ASSAY OF GONADOTROPIN (LH): CPT

## 2020-03-10 LAB
TESTOST FREE SERPL-MCNC: 10.2 PG/ML (ref 7.2–24)
TESTOST SERPL-MCNC: 411 NG/DL (ref 264–916)

## 2020-07-02 DIAGNOSIS — I10 ESSENTIAL HYPERTENSION: ICD-10-CM

## 2020-07-02 DIAGNOSIS — Z00.00 HEALTH CARE MAINTENANCE: ICD-10-CM

## 2020-07-02 DIAGNOSIS — E78.00 PURE HYPERCHOLESTEROLEMIA: Primary | ICD-10-CM

## 2020-07-02 DIAGNOSIS — Z12.11 ENCOUNTER FOR SCREENING COLONOSCOPY: ICD-10-CM

## 2020-07-27 ENCOUNTER — OFFICE VISIT (OUTPATIENT)
Dept: FAMILY MEDICINE CLINIC | Facility: CLINIC | Age: 55
End: 2020-07-27

## 2020-07-27 ENCOUNTER — LAB (OUTPATIENT)
Dept: LAB | Facility: HOSPITAL | Age: 55
End: 2020-07-27

## 2020-07-27 VITALS
SYSTOLIC BLOOD PRESSURE: 132 MMHG | TEMPERATURE: 98.9 F | DIASTOLIC BLOOD PRESSURE: 85 MMHG | HEIGHT: 70 IN | WEIGHT: 210.2 LBS | BODY MASS INDEX: 30.09 KG/M2 | OXYGEN SATURATION: 99 % | HEART RATE: 90 BPM

## 2020-07-27 DIAGNOSIS — Z12.5 SCREENING PSA (PROSTATE SPECIFIC ANTIGEN): ICD-10-CM

## 2020-07-27 DIAGNOSIS — I49.3 PVC (PREMATURE VENTRICULAR CONTRACTION): ICD-10-CM

## 2020-07-27 DIAGNOSIS — I10 ESSENTIAL HYPERTENSION: ICD-10-CM

## 2020-07-27 DIAGNOSIS — Z00.00 HEALTH CARE MAINTENANCE: Primary | ICD-10-CM

## 2020-07-27 LAB
ALBUMIN SERPL-MCNC: 4.4 G/DL (ref 3.5–5.2)
ALBUMIN/GLOB SERPL: 2 G/DL
ALP SERPL-CCNC: 71 U/L (ref 39–117)
ALT SERPL W P-5'-P-CCNC: 21 U/L (ref 1–41)
ANION GAP SERPL CALCULATED.3IONS-SCNC: 5.5 MMOL/L (ref 5–15)
AST SERPL-CCNC: 22 U/L (ref 1–40)
BASOPHILS # BLD AUTO: 0.04 10*3/MM3 (ref 0–0.2)
BASOPHILS NFR BLD AUTO: 0.9 % (ref 0–1.5)
BILIRUB SERPL-MCNC: 0.9 MG/DL (ref 0–1.2)
BUN SERPL-MCNC: 19 MG/DL (ref 6–20)
BUN/CREAT SERPL: 16.5 (ref 7–25)
CALCIUM SPEC-SCNC: 9.6 MG/DL (ref 8.6–10.5)
CHLORIDE SERPL-SCNC: 105 MMOL/L (ref 98–107)
CHOLEST SERPL-MCNC: 161 MG/DL (ref 0–200)
CO2 SERPL-SCNC: 30.5 MMOL/L (ref 22–29)
CREAT SERPL-MCNC: 1.15 MG/DL (ref 0.76–1.27)
DEPRECATED RDW RBC AUTO: 44.2 FL (ref 37–54)
EOSINOPHIL # BLD AUTO: 0.18 10*3/MM3 (ref 0–0.4)
EOSINOPHIL NFR BLD AUTO: 3.9 % (ref 0.3–6.2)
ERYTHROCYTE [DISTWIDTH] IN BLOOD BY AUTOMATED COUNT: 13.5 % (ref 12.3–15.4)
GFR SERPL CREATININE-BSD FRML MDRD: 67 ML/MIN/1.73
GLOBULIN UR ELPH-MCNC: 2.2 GM/DL
GLUCOSE SERPL-MCNC: 94 MG/DL (ref 65–99)
HBV SURFACE AB SER RIA-ACNC: REACTIVE
HCT VFR BLD AUTO: 47.5 % (ref 37.5–51)
HDLC SERPL-MCNC: 36 MG/DL (ref 40–60)
HGB BLD-MCNC: 15.6 G/DL (ref 13–17.7)
IMM GRANULOCYTES # BLD AUTO: 0.04 10*3/MM3 (ref 0–0.05)
IMM GRANULOCYTES NFR BLD AUTO: 0.9 % (ref 0–0.5)
LDLC SERPL CALC-MCNC: 108 MG/DL (ref 0–100)
LDLC/HDLC SERPL: 3.01 {RATIO}
LYMPHOCYTES # BLD AUTO: 1.11 10*3/MM3 (ref 0.7–3.1)
LYMPHOCYTES NFR BLD AUTO: 24.2 % (ref 19.6–45.3)
MCH RBC QN AUTO: 29.5 PG (ref 26.6–33)
MCHC RBC AUTO-ENTMCNC: 32.8 G/DL (ref 31.5–35.7)
MCV RBC AUTO: 90 FL (ref 79–97)
MONOCYTES # BLD AUTO: 0.5 10*3/MM3 (ref 0.1–0.9)
MONOCYTES NFR BLD AUTO: 10.9 % (ref 5–12)
NEUTROPHILS NFR BLD AUTO: 2.72 10*3/MM3 (ref 1.7–7)
NEUTROPHILS NFR BLD AUTO: 59.2 % (ref 42.7–76)
NRBC BLD AUTO-RTO: 0 /100 WBC (ref 0–0.2)
PLATELET # BLD AUTO: 123 10*3/MM3 (ref 140–450)
PMV BLD AUTO: 11.7 FL (ref 6–12)
POTASSIUM SERPL-SCNC: 4.3 MMOL/L (ref 3.5–5.2)
PROT SERPL-MCNC: 6.6 G/DL (ref 6–8.5)
PSA SERPL-MCNC: 1.48 NG/ML (ref 0–4)
RBC # BLD AUTO: 5.28 10*6/MM3 (ref 4.14–5.8)
SODIUM SERPL-SCNC: 141 MMOL/L (ref 136–145)
TRIGL SERPL-MCNC: 84 MG/DL (ref 0–150)
VLDLC SERPL-MCNC: 16.8 MG/DL (ref 5–40)
WBC # BLD AUTO: 4.59 10*3/MM3 (ref 3.4–10.8)

## 2020-07-27 PROCEDURE — 36415 COLL VENOUS BLD VENIPUNCTURE: CPT | Performed by: FAMILY MEDICINE

## 2020-07-27 PROCEDURE — 80053 COMPREHEN METABOLIC PANEL: CPT | Performed by: FAMILY MEDICINE

## 2020-07-27 PROCEDURE — 86706 HEP B SURFACE ANTIBODY: CPT | Performed by: FAMILY MEDICINE

## 2020-07-27 PROCEDURE — 99396 PREV VISIT EST AGE 40-64: CPT | Performed by: FAMILY MEDICINE

## 2020-07-27 PROCEDURE — G0103 PSA SCREENING: HCPCS | Performed by: FAMILY MEDICINE

## 2020-07-27 PROCEDURE — 80061 LIPID PANEL: CPT | Performed by: FAMILY MEDICINE

## 2020-07-27 PROCEDURE — 85025 COMPLETE CBC W/AUTO DIFF WBC: CPT | Performed by: FAMILY MEDICINE

## 2020-07-27 RX ORDER — LISINOPRIL 10 MG/1
10 TABLET ORAL DAILY
Qty: 90 TABLET | Refills: 3 | Status: SHIPPED | OUTPATIENT
Start: 2020-07-27 | End: 2021-08-02

## 2020-07-27 NOTE — PROGRESS NOTES
"Subjective   Jose Montanez is a 54 y.o. male.     Chief Complaint   Patient presents with   • Annual Exam        History of Present Illness    Here for annual wellness visit.    Hypertension.  He continues his lisinopril 10 mg a day.  He was having multiple asymptomatic PVCs.  He had a stress test and echocardiogram done and Holter monitor that was benign.  But frequent PVCs.  His cardiologist had him get back on his CPAP machine and start losing weight which he has done.  He is back exercising and watching his diet.  He does describe stressors but negative depression screen.  He is exercising regularly now with his stationary bike.  He still due for his colonoscopy.  Hyperlipidemia.  He had a CT CAC score a year and a half ago that was 0.      The following portions of the patient's history were reviewed and updated as appropriate: allergies, current medications, past family history, past medical history, past social history, past surgical history and problem list.          Review of Systems   Constitutional: Negative.    HENT: Negative.    Respiratory: Negative.    Cardiovascular: Negative.  Negative for palpitations.   Gastrointestinal: Negative.         No dysphagia.  No severe acid reflux symptoms.   Endocrine: Negative.    Genitourinary: Negative.    Musculoskeletal: Negative.    Skin: Negative.    Neurological: Negative.    Psychiatric/Behavioral: Negative.    All other systems reviewed and are negative.      Objective   Blood pressure 132/85, pulse 90, temperature 98.9 °F (37.2 °C), temperature source Tympanic, height 177.8 cm (70\"), weight 95.3 kg (210 lb 3.2 oz), SpO2 99 %.  Physical Exam   Constitutional: He is oriented to person, place, and time. He appears well-developed and well-nourished. No distress.   HENT:   Head: Atraumatic.   Right Ear: Tympanic membrane, external ear and ear canal normal.   Left Ear: Tympanic membrane, external ear and ear canal normal.   Nose: Nose normal.   Mouth/Throat: " Oropharynx is clear and moist. No oropharyngeal exudate.   Eyes: Conjunctivae are normal. Right eye exhibits no discharge. Left eye exhibits no discharge. No scleral icterus.   Neck: Normal range of motion. Neck supple. No thyromegaly present.   Cardiovascular: Normal rate, normal heart sounds and intact distal pulses.   Pulmonary/Chest: Effort normal and breath sounds normal. No respiratory distress. He has no wheezes. He has no rales.   Abdominal: Soft. Bowel sounds are normal. He exhibits no distension. There is no tenderness.   Musculoskeletal: He exhibits no edema.   Lymphadenopathy:     He has no cervical adenopathy.   Neurological: He is alert and oriented to person, place, and time. He exhibits normal muscle tone. Coordination normal.   Skin: Skin is warm and dry. No rash noted.   Psychiatric: He has a normal mood and affect. His behavior is normal. Judgment and thought content normal.   Nursing note and vitals reviewed.      Assessment/Plan   Jose was seen today for annual exam.    Diagnoses and all orders for this visit:    Health care maintenance  -     CBC & Differential  -     Comprehensive Metabolic Panel  -     Lipid Panel  -     PSA Screen  -     Hepatitis B Surface Antibody    Essential hypertension  -     Comprehensive Metabolic Panel  -     Lipid Panel    Screening PSA (prostate specific antigen)  -     PSA Screen    PVC (premature ventricular contraction)    Other orders  -     lisinopril (PRINIVIL,ZESTRIL) 10 MG tablet; Take 1 tablet by mouth Daily.      Annual wellness visit.    Hypertension.  Well-controlled.  Continue lisinopril.  His echocardiogram revealed normal left ventricular function.  The PVCs are frequent noted on examination today.  At this time I do not see a need for beta-blocker since these PVCs are asymptomatic.  He is also followed by his cardiologist.  I will see him in 1 year sooner as needed.    Immunizations.  Recommend Shingrix vaccine at retail pharmacy.  Also  checking hepatitis B surface antibody to verify hepatitis B status.  He has been immunized in the past.    Prostate cancer screening through his urologist.  I am ordering a PSA today.    Colon cancer screening.  He is due for colonoscopy and is going to be contacting a general surgeon soon to have this arranged in addition to a hernia repair.  He is already seen the surgeon.    Preventive health practice discussed doing regular exercise and mindfulness meditation.

## 2020-08-17 RX ORDER — LISINOPRIL 10 MG/1
10 TABLET ORAL DAILY
Qty: 90 TABLET | Refills: 3 | OUTPATIENT
Start: 2020-08-17

## 2020-08-24 ENCOUNTER — HOSPITAL ENCOUNTER (OUTPATIENT)
Dept: CARDIOLOGY | Facility: HOSPITAL | Age: 55
Discharge: HOME | End: 2020-08-24
Admitting: INTERNAL MEDICINE

## 2020-08-24 VITALS
HEIGHT: 70 IN | BODY MASS INDEX: 29.35 KG/M2 | DIASTOLIC BLOOD PRESSURE: 84 MMHG | SYSTOLIC BLOOD PRESSURE: 150 MMHG | HEART RATE: 85 BPM | WEIGHT: 205 LBS

## 2020-08-24 DIAGNOSIS — I49.3 PVC (PREMATURE VENTRICULAR CONTRACTION): ICD-10-CM

## 2020-08-24 LAB
ASCENDING AORTA: 2.7 CM
BH CV ECHO MEAS - ACS: 2.2 CM
BH CV ECHO MEAS - AO MAX PG (FULL): 6.2 MMHG
BH CV ECHO MEAS - AO MAX PG: 9.7 MMHG
BH CV ECHO MEAS - AO MEAN PG (FULL): 4 MMHG
BH CV ECHO MEAS - AO MEAN PG: 6 MMHG
BH CV ECHO MEAS - AO ROOT AREA (BSA CORRECTED): 1.7
BH CV ECHO MEAS - AO ROOT AREA: 9.6 CM^2
BH CV ECHO MEAS - AO ROOT DIAM: 3.5 CM
BH CV ECHO MEAS - AO V2 MAX: 156 CM/SEC
BH CV ECHO MEAS - AO V2 MEAN: 117 CM/SEC
BH CV ECHO MEAS - AO V2 VTI: 34.7 CM
BH CV ECHO MEAS - ASC AORTA: 2.7 CM
BH CV ECHO MEAS - AVA(I,A): 2.2 CM^2
BH CV ECHO MEAS - AVA(I,D): 2.2 CM^2
BH CV ECHO MEAS - AVA(V,A): 2.3 CM^2
BH CV ECHO MEAS - AVA(V,D): 2.3 CM^2
BH CV ECHO MEAS - BSA(HAYCOCK): 2.2 M^2
BH CV ECHO MEAS - BSA: 2.1 M^2
BH CV ECHO MEAS - BZI_BMI: 29.4 KILOGRAMS/M^2
BH CV ECHO MEAS - BZI_METRIC_HEIGHT: 177.8 CM
BH CV ECHO MEAS - BZI_METRIC_WEIGHT: 93 KG
BH CV ECHO MEAS - EDV(MOD-SP2): 92 ML
BH CV ECHO MEAS - EDV(MOD-SP4): 115 ML
BH CV ECHO MEAS - EDV(TEICH): 141.3 ML
BH CV ECHO MEAS - EF(CUBED): 62.3 %
BH CV ECHO MEAS - EF(MOD-BP): 53.5 %
BH CV ECHO MEAS - EF(MOD-SP2): 55.4 %
BH CV ECHO MEAS - EF(MOD-SP4): 54.8 %
BH CV ECHO MEAS - EF(TEICH): 53.4 %
BH CV ECHO MEAS - ESV(MOD-SP2): 41 ML
BH CV ECHO MEAS - ESV(MOD-SP4): 52 ML
BH CV ECHO MEAS - ESV(TEICH): 65.9 ML
BH CV ECHO MEAS - FS: 27.8 %
BH CV ECHO MEAS - IVS/LVPW: 1
BH CV ECHO MEAS - IVSD: 1.1 CM
BH CV ECHO MEAS - LAT PEAK E' VEL: 11.9 CM/SEC
BH CV ECHO MEAS - LV DIASTOLIC VOL/BSA (35-75): 54.5 ML/M^2
BH CV ECHO MEAS - LV MASS(C)D: 234.8 GRAMS
BH CV ECHO MEAS - LV MASS(C)DI: 111.3 GRAMS/M^2
BH CV ECHO MEAS - LV MAX PG: 3.6 MMHG
BH CV ECHO MEAS - LV MEAN PG: 2 MMHG
BH CV ECHO MEAS - LV SYSTOLIC VOL/BSA (12-30): 24.7 ML/M^2
BH CV ECHO MEAS - LV V1 MAX: 94.6 CM/SEC
BH CV ECHO MEAS - LV V1 MEAN: 69 CM/SEC
BH CV ECHO MEAS - LV V1 VTI: 20.3 CM
BH CV ECHO MEAS - LVIDD: 5.4 CM
BH CV ECHO MEAS - LVIDS: 3.9 CM
BH CV ECHO MEAS - LVLD AP2: 8 CM
BH CV ECHO MEAS - LVLD AP4: 8.3 CM
BH CV ECHO MEAS - LVLS AP2: 6.5 CM
BH CV ECHO MEAS - LVLS AP4: 7.2 CM
BH CV ECHO MEAS - LVOT AREA (M): 3.8 CM^2
BH CV ECHO MEAS - LVOT AREA: 3.8 CM^2
BH CV ECHO MEAS - LVOT DIAM: 2.2 CM
BH CV ECHO MEAS - LVPWD: 1.1 CM
BH CV ECHO MEAS - MED PEAK E' VEL: 7.9 CM/SEC
BH CV ECHO MEAS - MV A DUR: 0.1 SEC
BH CV ECHO MEAS - MV A MAX VEL: 78 CM/SEC
BH CV ECHO MEAS - MV DEC SLOPE: 367 CM/SEC^2
BH CV ECHO MEAS - MV DEC TIME: 0.17 SEC
BH CV ECHO MEAS - MV E MAX VEL: 85.7 CM/SEC
BH CV ECHO MEAS - MV E/A: 1.1
BH CV ECHO MEAS - MV MAX PG: 3 MMHG
BH CV ECHO MEAS - MV MEAN PG: 2 MMHG
BH CV ECHO MEAS - MV P1/2T MAX VEL: 91.1 CM/SEC
BH CV ECHO MEAS - MV P1/2T: 72.7 MSEC
BH CV ECHO MEAS - MV V2 MAX: 87.3 CM/SEC
BH CV ECHO MEAS - MV V2 MEAN: 68.3 CM/SEC
BH CV ECHO MEAS - MV V2 VTI: 22.3 CM
BH CV ECHO MEAS - MVA P1/2T LCG: 2.4 CM^2
BH CV ECHO MEAS - MVA(P1/2T): 3 CM^2
BH CV ECHO MEAS - MVA(VTI): 3.5 CM^2
BH CV ECHO MEAS - PA MAX PG (FULL): 4.6 MMHG
BH CV ECHO MEAS - PA MAX PG: 6.6 MMHG
BH CV ECHO MEAS - PA V2 MAX: 128 CM/SEC
BH CV ECHO MEAS - PULM A REVS DUR: 0.1 SEC
BH CV ECHO MEAS - PULM A REVS VEL: 35.1 CM/SEC
BH CV ECHO MEAS - PULM DIAS VEL: 54.8 CM/SEC
BH CV ECHO MEAS - PULM S/D: 1.2
BH CV ECHO MEAS - PULM SYS VEL: 66.8 CM/SEC
BH CV ECHO MEAS - PVA(V,A): 1.5 CM^2
BH CV ECHO MEAS - PVA(V,D): 1.5 CM^2
BH CV ECHO MEAS - QP/QS: 0.61
BH CV ECHO MEAS - RAP SYSTOLE: 8 MMHG
BH CV ECHO MEAS - RV MAX PG: 1.9 MMHG
BH CV ECHO MEAS - RV MEAN PG: 1 MMHG
BH CV ECHO MEAS - RV V1 MAX: 69.8 CM/SEC
BH CV ECHO MEAS - RV V1 MEAN: 50.7 CM/SEC
BH CV ECHO MEAS - RV V1 VTI: 16.6 CM
BH CV ECHO MEAS - RVOT AREA: 2.8 CM^2
BH CV ECHO MEAS - RVOT DIAM: 1.9 CM
BH CV ECHO MEAS - RVSP: 33 MMHG
BH CV ECHO MEAS - SI(AO): 158.3 ML/M^2
BH CV ECHO MEAS - SI(CUBED): 46.5 ML/M^2
BH CV ECHO MEAS - SI(LVOT): 36.6 ML/M^2
BH CV ECHO MEAS - SI(MOD-SP2): 24.2 ML/M^2
BH CV ECHO MEAS - SI(MOD-SP4): 29.9 ML/M^2
BH CV ECHO MEAS - SI(TEICH): 35.7 ML/M^2
BH CV ECHO MEAS - SUP REN AO DIAM: 1.7 CM
BH CV ECHO MEAS - SV(AO): 333.9 ML
BH CV ECHO MEAS - SV(CUBED): 98.1 ML
BH CV ECHO MEAS - SV(LVOT): 77.2 ML
BH CV ECHO MEAS - SV(MOD-SP2): 51 ML
BH CV ECHO MEAS - SV(MOD-SP4): 63 ML
BH CV ECHO MEAS - SV(RVOT): 47.1 ML
BH CV ECHO MEAS - SV(TEICH): 75.4 ML
BH CV ECHO MEAS - TAPSE (>1.6): 2.1 CM
BH CV ECHO MEAS - TR MAX VEL: 252 CM/SEC
BH CV ECHO MEASUREMENTS AVERAGE E/E' RATIO: 8.66
BH CV XLRA - RV BASE: 3.3 CM
BH CV XLRA - RV LENGTH: 5.9 CM
BH CV XLRA - RV MID: 2.9 CM
BH CV XLRA - TDI S': 15.4 CM/SEC
LEFT ATRIUM VOLUME INDEX: 31 ML/M2
LEFT ATRIUM VOLUME: 66 CM3
MAXIMAL PREDICTED HEART RATE: 167 BPM
SINUS: 2.8 CM
STJ: 2.9 CM
STRESS TARGET HR: 142 BPM

## 2020-08-24 PROCEDURE — 93306 TTE W/DOPPLER COMPLETE: CPT | Performed by: INTERNAL MEDICINE

## 2020-08-24 PROCEDURE — 93306 TTE W/DOPPLER COMPLETE: CPT

## 2020-08-26 ENCOUNTER — OFFICE VISIT (OUTPATIENT)
Dept: CARDIOLOGY | Facility: CLINIC | Age: 55
End: 2020-08-26

## 2020-08-26 VITALS
SYSTOLIC BLOOD PRESSURE: 122 MMHG | DIASTOLIC BLOOD PRESSURE: 88 MMHG | HEIGHT: 70 IN | HEART RATE: 72 BPM | BODY MASS INDEX: 29.63 KG/M2 | WEIGHT: 207 LBS

## 2020-08-26 DIAGNOSIS — I49.3 PVC (PREMATURE VENTRICULAR CONTRACTION): ICD-10-CM

## 2020-08-26 DIAGNOSIS — I10 ESSENTIAL HYPERTENSION: Primary | ICD-10-CM

## 2020-08-26 PROCEDURE — 99214 OFFICE O/P EST MOD 30 MIN: CPT | Performed by: INTERNAL MEDICINE

## 2020-08-26 PROCEDURE — 93000 ELECTROCARDIOGRAM COMPLETE: CPT | Performed by: INTERNAL MEDICINE

## 2020-08-26 RX ORDER — CETIRIZINE HYDROCHLORIDE 10 MG/1
10 TABLET ORAL DAILY PRN
COMMUNITY

## 2020-08-26 NOTE — PROGRESS NOTES
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  Since then he is lost 20 pounds he has been exercising regularly he feels great he never has really noticed the PVCs he did have a little surgical procedure on his foot and they were noticing the PVCs on the monitor but in general he is much better and feels good    Past Medical History:   Diagnosis Date   • Allergic    • Hypertension        Past Surgical History:   Procedure Laterality Date   • ADENOIDECTOMY     • BACK SURGERY     • ENDOVENOUS ABLATION SAPHENOUS VEIN W/ LASER Left 2019   • PLANTAR FASCIA SURGERY  2020    right heel    • SEPTOPLASTY     • TONSILLECTOMY         Social History     Socioeconomic History   • Marital status:      Spouse name: Not on file   • Number of children: Not on file   • Years of education: Not on file   • Highest education level: Not on file   Tobacco Use   • Smoking status: Never Smoker   • Smokeless tobacco: Never Used   Substance and Sexual Activity   • Alcohol use: Yes   • Drug use: No       Family History   Problem Relation Age of Onset   • Arthritis Mother    • Diverticulitis Mother    • Hypertension Father    • Coronary artery disease Father         med managment       Review of Systems   Constitution: Negative for decreased appetite, fever, malaise/fatigue and weight loss.   HENT: Negative for nosebleeds.    Eyes: Negative for double vision.   Cardiovascular: Negative for chest pain, claudication, cyanosis, dyspnea on exertion, irregular heartbeat, leg swelling, near-syncope, orthopnea,  "palpitations, paroxysmal nocturnal dyspnea and syncope.   Respiratory: Negative for cough, hemoptysis and shortness of breath.    Hematologic/Lymphatic: Negative for bleeding problem.   Skin: Negative for rash.   Musculoskeletal: Negative for falls and myalgias.   Gastrointestinal: Negative for hematochezia, jaundice, melena, nausea and vomiting.   Genitourinary: Negative for hematuria.   Neurological: Negative for dizziness and seizures.   Psychiatric/Behavioral: Negative for altered mental status and memory loss.       No Known Allergies      Current Outpatient Medications:   •  azelastine (OPTIVAR) 0.05 % ophthalmic solution, Apply  to eye., Disp: , Rfl:   •  cetirizine (zyrTEC) 10 MG tablet, Take 10 mg by mouth Daily., Disp: , Rfl:   •  lisinopril (PRINIVIL,ZESTRIL) 10 MG tablet, Take 1 tablet by mouth Daily., Disp: 90 tablet, Rfl: 3  •  Multiple Vitamins-Minerals (MULTIVITAL PO), Take 1 tablet by mouth Daily., Disp: , Rfl:   •  raNITIdine (ZANTAC) 300 MG tablet, Take 300 mg by mouth Every Night., Disp: , Rfl:       Objective:     Vitals:    08/26/20 1103   BP: 122/88   BP Location: Left arm   Patient Position: Sitting   Pulse: 72   Weight: 93.9 kg (207 lb)   Height: 177.8 cm (70\")     Body mass index is 29.7 kg/m².    Physical Exam   Constitutional: He is oriented to person, place, and time. He appears well-developed and well-nourished.   HENT:   Head: Normocephalic.   Eyes: No scleral icterus.   Neck: No JVD present. No thyromegaly present.   Cardiovascular: Normal rate, regular rhythm and normal heart sounds. Exam reveals no gallop and no friction rub.   No murmur heard.  Pulmonary/Chest: Effort normal and breath sounds normal. He has no wheezes. He has no rales.   Abdominal: Soft. There is no hepatosplenomegaly. There is no tenderness.   Musculoskeletal: Normal range of motion. He exhibits no edema.   Lymphadenopathy:     He has no cervical adenopathy.   Neurological: He is alert and oriented to person, " place, and time.   Skin: Skin is warm and dry. No rash noted.   Psychiatric: He has a normal mood and affect.         ECG 12 Lead  Date/Time: 8/26/2020 11:28 AM  Performed by: Jose C Anthony MD  Authorized by: Jose C Anthony MD   Comparison: compared with previous ECG   Similar to previous ECG  Rhythm: sinus rhythm  Ectopy: unifocal PVCs    Clinical impression: abnormal EKG          Assessment:       Diagnosis Plan   1. Essential hypertension     2. PVC (premature ventricular contraction)            Plan:       At this point I think he is doing great I did an echo on him again to make sure his LV function was not changing with all of his ectopy and it looks good he feels better blood pressures okay today I think we need to continue to treat his blood pressure I really want him to keep doing what he is doing he looks better to does not look as tired I will have him come back and see us in a year and then 1 a probably echo him one more time in a year to make sure everything is good    As always, it has been a pleasure to participate in your patient's care.      Sincerely,       Jose C Anthony MD

## 2020-12-20 ENCOUNTER — IMMUNIZATION (OUTPATIENT)
Dept: VACCINE CLINIC | Facility: HOSPITAL | Age: 55
End: 2020-12-20

## 2020-12-20 PROCEDURE — 0001A: CPT | Performed by: INTERNAL MEDICINE

## 2020-12-20 PROCEDURE — 91300 HC SARSCOV02 VAC 30MCG/0.3ML IM: CPT | Performed by: INTERNAL MEDICINE

## 2021-01-07 ENCOUNTER — IMMUNIZATION (OUTPATIENT)
Dept: VACCINE CLINIC | Facility: HOSPITAL | Age: 56
End: 2021-01-07

## 2021-01-07 PROCEDURE — 0001A: CPT | Performed by: INTERNAL MEDICINE

## 2021-01-07 PROCEDURE — 91300 HC SARSCOV02 VAC 30MCG/0.3ML IM: CPT | Performed by: INTERNAL MEDICINE

## 2021-01-07 PROCEDURE — 0002A: CPT | Performed by: INTERNAL MEDICINE

## 2021-06-02 ENCOUNTER — TRANSCRIBE ORDERS (OUTPATIENT)
Dept: PREADMISSION TESTING | Facility: HOSPITAL | Age: 56
End: 2021-06-02

## 2021-06-07 ENCOUNTER — PRE-ADMISSION TESTING (OUTPATIENT)
Dept: PREADMISSION TESTING | Facility: HOSPITAL | Age: 56
End: 2021-06-07

## 2021-06-07 VITALS
HEIGHT: 70 IN | DIASTOLIC BLOOD PRESSURE: 93 MMHG | WEIGHT: 226.1 LBS | OXYGEN SATURATION: 99 % | SYSTOLIC BLOOD PRESSURE: 137 MMHG | BODY MASS INDEX: 32.37 KG/M2 | HEART RATE: 89 BPM | RESPIRATION RATE: 16 BRPM

## 2021-06-07 LAB
ALBUMIN SERPL-MCNC: 4.5 G/DL (ref 3.5–5.2)
ALBUMIN/GLOB SERPL: 2 G/DL
ALP SERPL-CCNC: 74 U/L (ref 39–117)
ALT SERPL W P-5'-P-CCNC: 27 U/L (ref 1–41)
ANION GAP SERPL CALCULATED.3IONS-SCNC: 8.6 MMOL/L (ref 5–15)
AST SERPL-CCNC: 26 U/L (ref 1–40)
BASOPHILS # BLD AUTO: 0.06 10*3/MM3 (ref 0–0.2)
BASOPHILS NFR BLD AUTO: 1.2 % (ref 0–1.5)
BILIRUB SERPL-MCNC: 0.9 MG/DL (ref 0–1.2)
BUN SERPL-MCNC: 17 MG/DL (ref 6–20)
BUN/CREAT SERPL: 15.7 (ref 7–25)
CALCIUM SPEC-SCNC: 9.1 MG/DL (ref 8.6–10.5)
CHLORIDE SERPL-SCNC: 106 MMOL/L (ref 98–107)
CO2 SERPL-SCNC: 25.4 MMOL/L (ref 22–29)
CREAT SERPL-MCNC: 1.08 MG/DL (ref 0.76–1.27)
DEPRECATED RDW RBC AUTO: 39.5 FL (ref 37–54)
EOSINOPHIL # BLD AUTO: 0.18 10*3/MM3 (ref 0–0.4)
EOSINOPHIL NFR BLD AUTO: 3.6 % (ref 0.3–6.2)
ERYTHROCYTE [DISTWIDTH] IN BLOOD BY AUTOMATED COUNT: 12.9 % (ref 12.3–15.4)
GFR SERPL CREATININE-BSD FRML MDRD: 71 ML/MIN/1.73
GLOBULIN UR ELPH-MCNC: 2.3 GM/DL
GLUCOSE SERPL-MCNC: 94 MG/DL (ref 65–99)
HCT VFR BLD AUTO: 49.7 % (ref 37.5–51)
HGB BLD-MCNC: 17.2 G/DL (ref 13–17.7)
LYMPHOCYTES # BLD AUTO: 0.77 10*3/MM3 (ref 0.7–3.1)
LYMPHOCYTES NFR BLD AUTO: 15.2 % (ref 19.6–45.3)
MCH RBC QN AUTO: 29.8 PG (ref 26.6–33)
MCHC RBC AUTO-ENTMCNC: 34.6 G/DL (ref 31.5–35.7)
MCV RBC AUTO: 86 FL (ref 79–97)
MONOCYTES # BLD AUTO: 0.37 10*3/MM3 (ref 0.1–0.9)
MONOCYTES NFR BLD AUTO: 7.3 % (ref 5–12)
NEUTROPHILS NFR BLD AUTO: 3.63 10*3/MM3 (ref 1.7–7)
NEUTROPHILS NFR BLD AUTO: 71.9 % (ref 42.7–76)
PLATELET # BLD AUTO: 136 10*3/MM3 (ref 140–450)
PMV BLD AUTO: 11.5 FL (ref 6–12)
POTASSIUM SERPL-SCNC: 4.5 MMOL/L (ref 3.5–5.2)
PROT SERPL-MCNC: 6.8 G/DL (ref 6–8.5)
RBC # BLD AUTO: 5.78 10*6/MM3 (ref 4.14–5.8)
SODIUM SERPL-SCNC: 140 MMOL/L (ref 136–145)
WBC # BLD AUTO: 5.05 10*3/MM3 (ref 3.4–10.8)

## 2021-06-07 PROCEDURE — 36415 COLL VENOUS BLD VENIPUNCTURE: CPT

## 2021-06-07 PROCEDURE — 85025 COMPLETE CBC W/AUTO DIFF WBC: CPT

## 2021-06-07 PROCEDURE — 80053 COMPREHEN METABOLIC PANEL: CPT

## 2021-06-07 PROCEDURE — 93005 ELECTROCARDIOGRAM TRACING: CPT

## 2021-06-07 PROCEDURE — 93010 ELECTROCARDIOGRAM REPORT: CPT | Performed by: INTERNAL MEDICINE

## 2021-06-07 RX ORDER — OMEPRAZOLE 20 MG/1
20 CAPSULE, DELAYED RELEASE ORAL DAILY
COMMUNITY

## 2021-06-07 NOTE — DISCHARGE INSTRUCTIONS
Take the following medications the morning of surgery: OMEPRAZOLE      If you are on prescription narcotic pain medication to control your pain you may also take that medication the morning of surgery.    General Instructions:  • Do not eat solid food after midnight the night before surgery.  • You may drink clear liquids day of surgery but must stop at least one hour before your hospital arrival time.  • It is beneficial for you to have a clear drink that contains carbohydrates the day of surgery.  We suggest a 12 to 20 ounce bottle of Gatorade or Powerade for non-diabetic patients or a 12 to 20 ounce bottle of G2 or Powerade Zero for diabetic patients.     Clear liquids are liquids you can see through.  Nothing red in color.     Plain water                               Sports drinks  Sodas                                   Gelatin (Jell-O)  Fruit juices without pulp such as white grape juice and apple juice  Popsicles that contain no fruit or yogurt  Tea or coffee (no cream or milk added)  Gatorade / Powerade  G2 / Powerade Zero    • If applicable bring your C-PAP/ BI-PAP machine.  • Bring any papers given to you in the doctor’s office.  • Wear clean comfortable clothes.  • Do not wear contact lenses, false eyelashes or make-up.  Bring a case for your glasses.   • Bring crutches or walker if applicable.  • Remove all piercings.  Leave jewelry and any other valuables at home.  • The Pre-Admission Testing nurse will instruct you to bring medications if unable to obtain an accurate list in Pre-Admission Testing.            Preventing a Surgical Site Infection:  • For 2 to 3 days before surgery, avoid shaving with a razor because the razor can irritate skin and make it easier to develop an infection.    • Any areas of open skin can increase the risk of a post-operative wound infection by allowing bacteria to enter and travel throughout the body.  Notify your surgeon if you have any skin wounds / rashes even if it is  not near the expected surgical site.  The area will need assessed to determine if surgery should be delayed until it is healed.  • The night prior to surgery shower using a fresh bar of anti-bacterial soap (such as Dial) and clean washcloth.  Sleep in a clean bed with clean clothing.  Do not allow pets to sleep with you.  • Shower on the morning of surgery using a fresh bar of anti-bacterial soap (such as Dial) and clean washcloth.  Dry with a clean towel and dress in clean clothing.  • Ask your surgeon if you will be receiving antibiotics prior to surgery.  • Make sure you, your family, and all healthcare providers clean their hands with soap and water or an alcohol based hand  before caring for you or your wound.    Day of surgery:  Your arrival time is approximately two hours before your scheduled surgery time.  Upon arrival, a Pre-op nurse and Anesthesiologist will review your health history, obtain vital signs, and answer questions you may have.  The only belongings needed at this time will be a list of your home medications and if applicable your C-PAP/BI-PAP machine.  A Pre-op nurse will start an IV and you may receive medication in preparation for surgery, including something to help you relax.     Please be aware that surgery does come with discomfort.  We want to make every effort to control your discomfort so please discuss any uncontrolled symptoms with your nurse.   Your doctor will most likely have prescribed pain medications.      If you are going home after surgery you will receive individualized written care instructions before being discharged.  A responsible adult must drive you to and from the hospital on the day of your surgery and stay with you for 24 hours.  Discharge prescriptions can be filled by the hospital pharmacy during regular pharmacy hours.  If you are having surgery late in the day/evening your prescription may be e-prescribed to your pharmacy.  Please verify your pharmacy  hours or chose a 24 hour pharmacy to avoid not having access to your prescription because your pharmacy has closed for the day.    If you are staying overnight following surgery, you will be transported to your hospital room following the recovery period.  [facility] has all private rooms.    If you have any questions please call Pre-Admission Testing at [phone].  Deductibles and co-payments are collected on the day of service. Please be prepared to pay the required co-pay, deductible or deposit on the day of service as defined by your plan.    Patient Education for Self-Quarantine Process    Following your COVID testing, we strongly recommend that you do not leave your home after you have been tested for COVID except to get medical care. This includes not going to work, school or to public areas.  If this is not possible for you to do please limit your activities to only required outings.  Be sure to wear a mask when you are with other people, practice social distancing and wash your hands frequently.      The following items provide additional details to keep you safe.  • Wash your hands with soap and water frequently for at least 20 seconds.   • Avoid touching your eyes, nose and mouth with unwashed hands.  • Do not share anything - utensils, towels, food from the same bowl.   • Have your own utensils, drinking glass, dishes, towels and bedding.   • Do not have visitors.   • Do use FaceTime to stay in touch with family and friends.  • You should stay in a specific room away from others if possible.   • Stay at least 6 feet away from others in the home if you cannot have a dedicated room to yourself.   • Do not snuggle with your pet. While the CDC says there is no evidence that pets can spread COVID-19 or be infected from humans, it is probably best to avoid “petting, snuggling, being kissed or licked and sharing food (during self-quarantine)”, according to the CDC.   • Sanitize household  surfaces daily. Include all high touch areas (door handles, light switches, phones, countertops, etc.)  • Do not share a bathroom with others, if possible.   • Wear a mask around others in your home if you are unable to stay in a separate room or 6 feet apart. If  you are unable to wear a mask, have your family member wear a mask if they must be within 6 feet of you.   Call your surgeon immediately if you experience any of the following symptoms:  • Sore Throat  • Shortness of Breath or difficulty breathing  • Cough  • Chills  • Body soreness or muscle pain  • Headache  • Fever  • New loss of taste or smell  • Do not arrive for your surgery ill.  Your procedure will need to be rescheduled to another time.  You will need to call your physician before the day of surgery to avoid any unnecessary exposure to hospital staff as well as other patients.

## 2021-06-08 ENCOUNTER — TELEPHONE (OUTPATIENT)
Dept: CARDIOLOGY | Facility: CLINIC | Age: 56
End: 2021-06-08

## 2021-06-08 LAB — QT INTERVAL: 358 MS

## 2021-06-08 NOTE — TELEPHONE ENCOUNTER
Magi with pre-adm. Testing needing to get a surgery clearance for Jose. Having surgery with Dr. Ki Sow [phone]

## 2021-06-08 NOTE — TELEPHONE ENCOUNTER
Called left a message for Magi in pre adm. Testing that the patient is at an acceptable risk  If she needs a clearance fax to her to call me back with a fax

## 2021-06-11 ENCOUNTER — APPOINTMENT (OUTPATIENT)
Dept: LAB | Facility: HOSPITAL | Age: 56
End: 2021-06-11

## 2021-06-11 ENCOUNTER — TRANSCRIBE ORDERS (OUTPATIENT)
Dept: ADMINISTRATIVE | Facility: HOSPITAL | Age: 56
End: 2021-06-11

## 2021-06-11 ENCOUNTER — LAB (OUTPATIENT)
Dept: LAB | Facility: HOSPITAL | Age: 56
End: 2021-06-11

## 2021-06-11 DIAGNOSIS — Z01.818 PRE-OP EXAM: ICD-10-CM

## 2021-06-11 DIAGNOSIS — Z01.818 PRE-OP EXAM: Primary | ICD-10-CM

## 2021-06-11 LAB — SARS-COV-2 ORF1AB RESP QL NAA+PROBE: NOT DETECTED

## 2021-06-11 PROCEDURE — U0004 COV-19 TEST NON-CDC HGH THRU: HCPCS

## 2021-06-11 PROCEDURE — C9803 HOPD COVID-19 SPEC COLLECT: HCPCS

## 2021-06-14 ENCOUNTER — ANESTHESIA EVENT (OUTPATIENT)
Dept: PERIOP | Facility: HOSPITAL | Age: 56
End: 2021-06-14

## 2021-06-14 ENCOUNTER — ANESTHESIA (OUTPATIENT)
Dept: PERIOP | Facility: HOSPITAL | Age: 56
End: 2021-06-14

## 2021-06-14 ENCOUNTER — HOSPITAL ENCOUNTER (OUTPATIENT)
Facility: HOSPITAL | Age: 56
Discharge: HOME | End: 2021-06-15
Attending: ORTHOPAEDIC SURGERY | Admitting: ORTHOPAEDIC SURGERY

## 2021-06-14 ENCOUNTER — APPOINTMENT (OUTPATIENT)
Dept: GENERAL RADIOLOGY | Facility: HOSPITAL | Age: 56
End: 2021-06-14

## 2021-06-14 DIAGNOSIS — M76.821 POSTERIOR TIBIAL TENDON DYSFUNCTION (PTTD) OF RIGHT LOWER EXTREMITY: Primary | ICD-10-CM

## 2021-06-14 PROCEDURE — 25010000002 VANCOMYCIN 1 G RECONSTITUTED SOLUTION: Performed by: ORTHOPAEDIC SURGERY

## 2021-06-14 PROCEDURE — 25010000002 FENTANYL CITRATE (PF) 50 MCG/ML SOLUTION: Performed by: NURSE ANESTHETIST, CERTIFIED REGISTERED

## 2021-06-14 PROCEDURE — C1713 ANCHOR/SCREW BN/BN,TIS/BN: HCPCS | Performed by: ORTHOPAEDIC SURGERY

## 2021-06-14 PROCEDURE — C1769 GUIDE WIRE: HCPCS | Performed by: ORTHOPAEDIC SURGERY

## 2021-06-14 PROCEDURE — 76942 ECHO GUIDE FOR BIOPSY: CPT | Performed by: ORTHOPAEDIC SURGERY

## 2021-06-14 PROCEDURE — 25010000002 PHENYLEPHRINE PER 1 ML: Performed by: NURSE ANESTHETIST, CERTIFIED REGISTERED

## 2021-06-14 PROCEDURE — 25010000003 CEFAZOLIN IN DEXTROSE 2-4 GM/100ML-% SOLUTION: Performed by: ORTHOPAEDIC SURGERY

## 2021-06-14 PROCEDURE — 25010000002 HYDROMORPHONE PER 4 MG: Performed by: NURSE ANESTHETIST, CERTIFIED REGISTERED

## 2021-06-14 PROCEDURE — 76000 FLUOROSCOPY <1 HR PHYS/QHP: CPT

## 2021-06-14 PROCEDURE — 25010000002 MIDAZOLAM PER 1 MG: Performed by: ANESTHESIOLOGY

## 2021-06-14 PROCEDURE — C1776 JOINT DEVICE (IMPLANTABLE): HCPCS | Performed by: ORTHOPAEDIC SURGERY

## 2021-06-14 PROCEDURE — 25010000002 FENTANYL CITRATE (PF) 50 MCG/ML SOLUTION: Performed by: ANESTHESIOLOGY

## 2021-06-14 PROCEDURE — 25010000002 ROPIVACAINE PER 1 MG: Performed by: ANESTHESIOLOGY

## 2021-06-14 PROCEDURE — 25010000002 PROPOFOL 10 MG/ML EMULSION: Performed by: NURSE ANESTHETIST, CERTIFIED REGISTERED

## 2021-06-14 PROCEDURE — 25010000002 DEXAMETHASONE PER 1 MG: Performed by: ANESTHESIOLOGY

## 2021-06-14 PROCEDURE — 25010000002 ONDANSETRON PER 1 MG: Performed by: NURSE ANESTHETIST, CERTIFIED REGISTERED

## 2021-06-14 PROCEDURE — 73630 X-RAY EXAM OF FOOT: CPT

## 2021-06-14 PROCEDURE — C1889 IMPLANT/INSERT DEVICE, NOC: HCPCS | Performed by: ORTHOPAEDIC SURGERY

## 2021-06-14 DEVICE — IMPLANTABLE DEVICE: Type: IMPLANTABLE DEVICE | Site: ANKLE | Status: FUNCTIONAL

## 2021-06-14 DEVICE — SCRW COMPR NOHD FUL/THRD TI LG 5X50MM: Type: IMPLANTABLE DEVICE | Site: ANKLE | Status: FUNCTIONAL

## 2021-06-14 DEVICE — KT BIOTENODESIS W/FW4PK: Type: IMPLANTABLE DEVICE | Site: ANKLE | Status: FUNCTIONAL

## 2021-06-14 DEVICE — DBM ALLOSYNC PURE 5CC: Type: IMPLANTABLE DEVICE | Site: ANKLE | Status: FUNCTIONAL

## 2021-06-14 DEVICE — TENO SCRW,BIO-COMP
Type: IMPLANTABLE DEVICE | Site: ANKLE | Status: FUNCTIONAL
Brand: ARTHREX®

## 2021-06-14 RX ORDER — ONDANSETRON 2 MG/ML
4 INJECTION INTRAMUSCULAR; INTRAVENOUS ONCE AS NEEDED
Status: DISCONTINUED | OUTPATIENT
Start: 2021-06-14 | End: 2021-06-14 | Stop reason: HOSPADM

## 2021-06-14 RX ORDER — CEFAZOLIN SODIUM 2 G/100ML
2 INJECTION, SOLUTION INTRAVENOUS ONCE
Status: COMPLETED | OUTPATIENT
Start: 2021-06-14 | End: 2021-06-14

## 2021-06-14 RX ORDER — DROPERIDOL 2.5 MG/ML
0.62 INJECTION, SOLUTION INTRAMUSCULAR; INTRAVENOUS ONCE AS NEEDED
Status: DISCONTINUED | OUTPATIENT
Start: 2021-06-14 | End: 2021-06-14 | Stop reason: HOSPADM

## 2021-06-14 RX ORDER — FENTANYL CITRATE 50 UG/ML
50 INJECTION, SOLUTION INTRAMUSCULAR; INTRAVENOUS
Status: DISCONTINUED | OUTPATIENT
Start: 2021-06-14 | End: 2021-06-14 | Stop reason: HOSPADM

## 2021-06-14 RX ORDER — SODIUM CHLORIDE 0.9 % (FLUSH) 0.9 %
3-10 SYRINGE (ML) INJECTION AS NEEDED
Status: DISCONTINUED | OUTPATIENT
Start: 2021-06-14 | End: 2021-06-14 | Stop reason: HOSPADM

## 2021-06-14 RX ORDER — LISINOPRIL 10 MG/1
10 TABLET ORAL DAILY
Status: DISCONTINUED | OUTPATIENT
Start: 2021-06-14 | End: 2021-06-15 | Stop reason: HOSPADM

## 2021-06-14 RX ORDER — ROPIVACAINE HYDROCHLORIDE 5 MG/ML
INJECTION, SOLUTION EPIDURAL; INFILTRATION; PERINEURAL
Status: COMPLETED | OUTPATIENT
Start: 2021-06-14 | End: 2021-06-14

## 2021-06-14 RX ORDER — POLYETHYLENE GLYCOL 3350 17 G/17G
17 POWDER, FOR SOLUTION ORAL DAILY PRN
Status: DISCONTINUED | OUTPATIENT
Start: 2021-06-14 | End: 2021-06-15 | Stop reason: HOSPADM

## 2021-06-14 RX ORDER — OXYCODONE AND ACETAMINOPHEN 10; 325 MG/1; MG/1
1 TABLET ORAL EVERY 4 HOURS PRN
Status: DISCONTINUED | OUTPATIENT
Start: 2021-06-14 | End: 2021-06-15 | Stop reason: HOSPADM

## 2021-06-14 RX ORDER — HYDROMORPHONE HYDROCHLORIDE 1 MG/ML
0.5 INJECTION, SOLUTION INTRAMUSCULAR; INTRAVENOUS; SUBCUTANEOUS
Status: DISCONTINUED | OUTPATIENT
Start: 2021-06-14 | End: 2021-06-14 | Stop reason: HOSPADM

## 2021-06-14 RX ORDER — BUPIVACAINE HYDROCHLORIDE 2.5 MG/ML
INJECTION, SOLUTION EPIDURAL; INFILTRATION; INTRACAUDAL
Status: COMPLETED
Start: 2021-06-14 | End: 2021-06-14

## 2021-06-14 RX ORDER — PANTOPRAZOLE SODIUM 40 MG/1
40 TABLET, DELAYED RELEASE ORAL EVERY MORNING
Status: DISCONTINUED | OUTPATIENT
Start: 2021-06-15 | End: 2021-06-15 | Stop reason: HOSPADM

## 2021-06-14 RX ORDER — ONDANSETRON 2 MG/ML
INJECTION INTRAMUSCULAR; INTRAVENOUS AS NEEDED
Status: DISCONTINUED | OUTPATIENT
Start: 2021-06-14 | End: 2021-06-14 | Stop reason: SURG

## 2021-06-14 RX ORDER — ASPIRIN 325 MG
325 TABLET, DELAYED RELEASE (ENTERIC COATED) ORAL DAILY
Status: DISCONTINUED | OUTPATIENT
Start: 2021-06-15 | End: 2021-06-15 | Stop reason: HOSPADM

## 2021-06-14 RX ORDER — ONDANSETRON 2 MG/ML
4 INJECTION INTRAMUSCULAR; INTRAVENOUS EVERY 6 HOURS PRN
Status: DISCONTINUED | OUTPATIENT
Start: 2021-06-14 | End: 2021-06-15 | Stop reason: HOSPADM

## 2021-06-14 RX ORDER — NALOXONE HCL 0.4 MG/ML
0.4 VIAL (ML) INJECTION AS NEEDED
Status: DISCONTINUED | OUTPATIENT
Start: 2021-06-14 | End: 2021-06-14 | Stop reason: HOSPADM

## 2021-06-14 RX ORDER — DIPHENHYDRAMINE HYDROCHLORIDE 50 MG/ML
12.5 INJECTION INTRAMUSCULAR; INTRAVENOUS
Status: DISCONTINUED | OUTPATIENT
Start: 2021-06-14 | End: 2021-06-14 | Stop reason: HOSPADM

## 2021-06-14 RX ORDER — BUPIVACAINE HYDROCHLORIDE 2.5 MG/ML
INJECTION, SOLUTION EPIDURAL; INFILTRATION; INTRACAUDAL
Status: COMPLETED | OUTPATIENT
Start: 2021-06-14 | End: 2021-06-14

## 2021-06-14 RX ORDER — BISACODYL 5 MG/1
5 TABLET, DELAYED RELEASE ORAL DAILY PRN
Status: DISCONTINUED | OUTPATIENT
Start: 2021-06-14 | End: 2021-06-15 | Stop reason: HOSPADM

## 2021-06-14 RX ORDER — SODIUM CHLORIDE 0.9 % (FLUSH) 0.9 %
3 SYRINGE (ML) INJECTION EVERY 12 HOURS SCHEDULED
Status: DISCONTINUED | OUTPATIENT
Start: 2021-06-14 | End: 2021-06-14 | Stop reason: HOSPADM

## 2021-06-14 RX ORDER — OXYCODONE AND ACETAMINOPHEN 10; 325 MG/1; MG/1
2 TABLET ORAL EVERY 4 HOURS PRN
Status: DISCONTINUED | OUTPATIENT
Start: 2021-06-14 | End: 2021-06-15 | Stop reason: HOSPADM

## 2021-06-14 RX ORDER — OXYCODONE AND ACETAMINOPHEN 7.5; 325 MG/1; MG/1
1 TABLET ORAL ONCE AS NEEDED
Status: DISCONTINUED | OUTPATIENT
Start: 2021-06-14 | End: 2021-06-14 | Stop reason: HOSPADM

## 2021-06-14 RX ORDER — FENTANYL CITRATE 50 UG/ML
INJECTION, SOLUTION INTRAMUSCULAR; INTRAVENOUS AS NEEDED
Status: DISCONTINUED | OUTPATIENT
Start: 2021-06-14 | End: 2021-06-14 | Stop reason: SURG

## 2021-06-14 RX ORDER — BISACODYL 10 MG
10 SUPPOSITORY, RECTAL RECTAL DAILY PRN
Status: DISCONTINUED | OUTPATIENT
Start: 2021-06-14 | End: 2021-06-15 | Stop reason: HOSPADM

## 2021-06-14 RX ORDER — HYDROMORPHONE HCL 110MG/55ML
PATIENT CONTROLLED ANALGESIA SYRINGE INTRAVENOUS AS NEEDED
Status: DISCONTINUED | OUTPATIENT
Start: 2021-06-14 | End: 2021-06-14 | Stop reason: SURG

## 2021-06-14 RX ORDER — DEXAMETHASONE SODIUM PHOSPHATE 4 MG/ML
INJECTION, SOLUTION INTRA-ARTICULAR; INTRALESIONAL; INTRAMUSCULAR; INTRAVENOUS; SOFT TISSUE
Status: COMPLETED | OUTPATIENT
Start: 2021-06-14 | End: 2021-06-14

## 2021-06-14 RX ORDER — PROPOFOL 10 MG/ML
VIAL (ML) INTRAVENOUS AS NEEDED
Status: DISCONTINUED | OUTPATIENT
Start: 2021-06-14 | End: 2021-06-14 | Stop reason: SURG

## 2021-06-14 RX ORDER — LIDOCAINE HYDROCHLORIDE 10 MG/ML
0.5 INJECTION, SOLUTION EPIDURAL; INFILTRATION; INTRACAUDAL; PERINEURAL ONCE AS NEEDED
Status: DISCONTINUED | OUTPATIENT
Start: 2021-06-14 | End: 2021-06-14 | Stop reason: HOSPADM

## 2021-06-14 RX ORDER — MIDAZOLAM HYDROCHLORIDE 1 MG/ML
1 INJECTION INTRAMUSCULAR; INTRAVENOUS
Status: DISCONTINUED | OUTPATIENT
Start: 2021-06-14 | End: 2021-06-14 | Stop reason: HOSPADM

## 2021-06-14 RX ORDER — NALOXONE HCL 0.4 MG/ML
0.4 VIAL (ML) INJECTION
Status: DISCONTINUED | OUTPATIENT
Start: 2021-06-14 | End: 2021-06-15 | Stop reason: HOSPADM

## 2021-06-14 RX ORDER — GLYCOPYRROLATE 0.2 MG/ML
INJECTION INTRAMUSCULAR; INTRAVENOUS AS NEEDED
Status: DISCONTINUED | OUTPATIENT
Start: 2021-06-14 | End: 2021-06-14 | Stop reason: SURG

## 2021-06-14 RX ORDER — CEFAZOLIN SODIUM 2 G/100ML
2 INJECTION, SOLUTION INTRAVENOUS EVERY 8 HOURS
Status: COMPLETED | OUTPATIENT
Start: 2021-06-14 | End: 2021-06-15

## 2021-06-14 RX ORDER — VANCOMYCIN HYDROCHLORIDE 1 G/20ML
INJECTION, POWDER, LYOPHILIZED, FOR SOLUTION INTRAVENOUS AS NEEDED
Status: DISCONTINUED | OUTPATIENT
Start: 2021-06-14 | End: 2021-06-14 | Stop reason: HOSPADM

## 2021-06-14 RX ORDER — MAGNESIUM HYDROXIDE 1200 MG/15ML
LIQUID ORAL AS NEEDED
Status: DISCONTINUED | OUTPATIENT
Start: 2021-06-14 | End: 2021-06-14 | Stop reason: HOSPADM

## 2021-06-14 RX ORDER — ENALAPRILAT 2.5 MG/2ML
1.25 INJECTION INTRAVENOUS ONCE AS NEEDED
Status: DISCONTINUED | OUTPATIENT
Start: 2021-06-14 | End: 2021-06-14 | Stop reason: HOSPADM

## 2021-06-14 RX ORDER — KETOTIFEN FUMARATE 0.35 MG/ML
1 SOLUTION/ DROPS OPHTHALMIC NIGHTLY
Status: DISCONTINUED | OUTPATIENT
Start: 2021-06-14 | End: 2021-06-15 | Stop reason: HOSPADM

## 2021-06-14 RX ORDER — ALUMINA, MAGNESIA, AND SIMETHICONE 2400; 2400; 240 MG/30ML; MG/30ML; MG/30ML
15 SUSPENSION ORAL EVERY 6 HOURS PRN
Status: DISCONTINUED | OUTPATIENT
Start: 2021-06-14 | End: 2021-06-15 | Stop reason: HOSPADM

## 2021-06-14 RX ORDER — LIDOCAINE HYDROCHLORIDE 20 MG/ML
INJECTION, SOLUTION INFILTRATION; PERINEURAL AS NEEDED
Status: DISCONTINUED | OUTPATIENT
Start: 2021-06-14 | End: 2021-06-14 | Stop reason: SURG

## 2021-06-14 RX ORDER — HYDROCODONE BITARTRATE AND ACETAMINOPHEN 5; 325 MG/1; MG/1
1 TABLET ORAL ONCE AS NEEDED
Status: DISCONTINUED | OUTPATIENT
Start: 2021-06-14 | End: 2021-06-14 | Stop reason: HOSPADM

## 2021-06-14 RX ORDER — SODIUM CHLORIDE 0.9 % (FLUSH) 0.9 %
10 SYRINGE (ML) INJECTION AS NEEDED
Status: DISCONTINUED | OUTPATIENT
Start: 2021-06-14 | End: 2021-06-15 | Stop reason: HOSPADM

## 2021-06-14 RX ORDER — EPHEDRINE SULFATE 50 MG/ML
INJECTION, SOLUTION INTRAVENOUS AS NEEDED
Status: DISCONTINUED | OUTPATIENT
Start: 2021-06-14 | End: 2021-06-14 | Stop reason: SURG

## 2021-06-14 RX ORDER — AMOXICILLIN 250 MG
2 CAPSULE ORAL 2 TIMES DAILY
Status: DISCONTINUED | OUTPATIENT
Start: 2021-06-14 | End: 2021-06-15 | Stop reason: HOSPADM

## 2021-06-14 RX ORDER — SODIUM CHLORIDE 0.9 % (FLUSH) 0.9 %
10 SYRINGE (ML) INJECTION EVERY 12 HOURS SCHEDULED
Status: DISCONTINUED | OUTPATIENT
Start: 2021-06-14 | End: 2021-06-15 | Stop reason: HOSPADM

## 2021-06-14 RX ORDER — CETIRIZINE HYDROCHLORIDE 10 MG/1
10 TABLET ORAL DAILY PRN
Status: DISCONTINUED | OUTPATIENT
Start: 2021-06-14 | End: 2021-06-15 | Stop reason: HOSPADM

## 2021-06-14 RX ORDER — SODIUM CHLORIDE, SODIUM LACTATE, POTASSIUM CHLORIDE, CALCIUM CHLORIDE 600; 310; 30; 20 MG/100ML; MG/100ML; MG/100ML; MG/100ML
9 INJECTION, SOLUTION INTRAVENOUS CONTINUOUS
Status: DISCONTINUED | OUTPATIENT
Start: 2021-06-14 | End: 2021-06-15 | Stop reason: HOSPADM

## 2021-06-14 RX ADMIN — PHENYLEPHRINE HYDROCHLORIDE 100 MCG: 10 INJECTION INTRAVENOUS at 08:46

## 2021-06-14 RX ADMIN — DEXAMETHASONE SODIUM PHOSPHATE 10 MG: 4 INJECTION, SOLUTION INTRAMUSCULAR; INTRAVENOUS at 07:49

## 2021-06-14 RX ADMIN — DEXAMETHASONE SODIUM PHOSPHATE 4 MG: 4 INJECTION, SOLUTION INTRAMUSCULAR; INTRAVENOUS at 07:32

## 2021-06-14 RX ADMIN — PHENYLEPHRINE HYDROCHLORIDE 100 MCG: 10 INJECTION INTRAVENOUS at 08:41

## 2021-06-14 RX ADMIN — FENTANYL CITRATE 25 MCG: 50 INJECTION INTRAMUSCULAR; INTRAVENOUS at 09:39

## 2021-06-14 RX ADMIN — CEFAZOLIN SODIUM 2 G: 2 INJECTION, SOLUTION INTRAVENOUS at 15:55

## 2021-06-14 RX ADMIN — ONDANSETRON 4 MG: 2 INJECTION INTRAMUSCULAR; INTRAVENOUS at 09:51

## 2021-06-14 RX ADMIN — FENTANYL CITRATE 25 MCG: 50 INJECTION INTRAMUSCULAR; INTRAVENOUS at 09:33

## 2021-06-14 RX ADMIN — FENTANYL CITRATE 25 MCG: 50 INJECTION INTRAMUSCULAR; INTRAVENOUS at 08:17

## 2021-06-14 RX ADMIN — CEFAZOLIN SODIUM 2 G: 2 INJECTION, SOLUTION INTRAVENOUS at 22:03

## 2021-06-14 RX ADMIN — SODIUM CHLORIDE, POTASSIUM CHLORIDE, SODIUM LACTATE AND CALCIUM CHLORIDE: 600; 310; 30; 20 INJECTION, SOLUTION INTRAVENOUS at 08:44

## 2021-06-14 RX ADMIN — FENTANYL CITRATE 50 MCG: 50 INJECTION INTRAMUSCULAR; INTRAVENOUS at 09:48

## 2021-06-14 RX ADMIN — BUPIVACAINE HYDROCHLORIDE 20 ML: 2.5 INJECTION, SOLUTION EPIDURAL; INFILTRATION; INTRACAUDAL; PERINEURAL at 07:36

## 2021-06-14 RX ADMIN — LISINOPRIL 10 MG: 10 TABLET ORAL at 12:18

## 2021-06-14 RX ADMIN — GLYCOPYRROLATE 0.2 MG: 0.2 INJECTION INTRAMUSCULAR; INTRAVENOUS at 09:16

## 2021-06-14 RX ADMIN — DOCUSATE SODIUM 50MG AND SENNOSIDES 8.6MG 2 TABLET: 8.6; 5 TABLET, FILM COATED ORAL at 20:23

## 2021-06-14 RX ADMIN — CEFAZOLIN SODIUM 2 G: 2 INJECTION, SOLUTION INTRAVENOUS at 07:49

## 2021-06-14 RX ADMIN — PHENYLEPHRINE HYDROCHLORIDE 100 MCG: 10 INJECTION INTRAVENOUS at 08:21

## 2021-06-14 RX ADMIN — HYDROMORPHONE HYDROCHLORIDE 0.5 MG: 2 INJECTION, SOLUTION INTRAMUSCULAR; INTRAVENOUS; SUBCUTANEOUS at 09:48

## 2021-06-14 RX ADMIN — LIDOCAINE HYDROCHLORIDE 60 MG: 20 INJECTION, SOLUTION INFILTRATION; PERINEURAL at 07:46

## 2021-06-14 RX ADMIN — KETOTIFEN FUMARATE 1 DROP: 0.35 SOLUTION/ DROPS OPHTHALMIC at 20:24

## 2021-06-14 RX ADMIN — FENTANYL CITRATE 100 MCG: 50 INJECTION, SOLUTION INTRAMUSCULAR; INTRAVENOUS at 07:21

## 2021-06-14 RX ADMIN — DOCUSATE SODIUM 50MG AND SENNOSIDES 8.6MG 2 TABLET: 8.6; 5 TABLET, FILM COATED ORAL at 12:18

## 2021-06-14 RX ADMIN — PHENYLEPHRINE HYDROCHLORIDE 100 MCG: 10 INJECTION INTRAVENOUS at 08:31

## 2021-06-14 RX ADMIN — PROPOFOL 200 MG: 10 INJECTION, EMULSION INTRAVENOUS at 07:46

## 2021-06-14 RX ADMIN — FENTANYL CITRATE 25 MCG: 50 INJECTION INTRAMUSCULAR; INTRAVENOUS at 09:01

## 2021-06-14 RX ADMIN — ROPIVACAINE HYDROCHLORIDE 30 ML: 5 INJECTION, SOLUTION EPIDURAL; INFILTRATION; PERINEURAL at 07:32

## 2021-06-14 RX ADMIN — EPHEDRINE SULFATE 10 MG: 50 INJECTION INTRAVENOUS at 09:18

## 2021-06-14 RX ADMIN — SODIUM CHLORIDE, POTASSIUM CHLORIDE, SODIUM LACTATE AND CALCIUM CHLORIDE 9 ML/HR: 600; 310; 30; 20 INJECTION, SOLUTION INTRAVENOUS at 06:58

## 2021-06-14 RX ADMIN — MIDAZOLAM 2 MG: 1 INJECTION INTRAMUSCULAR; INTRAVENOUS at 07:20

## 2021-06-14 NOTE — ANESTHESIA PROCEDURE NOTES
Peripheral Block      Patient reassessed immediately prior to procedure    Patient location during procedure: pre-op  Start time: 6/14/2021 7:28 AM  Stop time: 6/14/2021 7:32 AM  Reason for block: at surgeon's request and post-op pain management  Performed by  Anesthesiologist: Kalpesh Scanlon MD  Preanesthetic Checklist  Completed: patient identified, IV checked, site marked, risks and benefits discussed, surgical consent, monitors and equipment checked, pre-op evaluation and timeout performed  Prep:  Pt Position: supine  Sterile barriers:gloves and cap  Prep: ChloraPrep  Patient monitoring: blood pressure monitoring, continuous pulse oximetry and EKG  Procedure  Sedation:yes    Guidance:ultrasound guided  ULTRASOUND INTERPRETATION.  Using ultrasound guidance a 21 G gauge needle was placed in close proximity to the sciatic nerve, at which point, under ultrasound guidance anesthetic was injected in the area of the nerve and spread of the anesthesia was seen on ultrasound in close proximity thereto.  There were no abnormalities seen on ultrasound; a digital image was taken; and the patient tolerated the procedure with no complications. Images:still images obtained    Block Type:popliteal  Injection Technique:single-shot  Needle Type:echogenic  Needle Gauge:21 G  Resistance on Injection: none    Medications Used: ropivacaine (NAROPIN) 0.5 % injection, 30 mL  dexamethasone (DECADRON) injection, 4 mg  Med admintered at 6/14/2021 7:32 AM      Post Assessment  Injection Assessment: negative aspiration for heme, no paresthesia on injection and incremental injection  Patient Tolerance:comfortable throughout block  Complications:no  Additional Notes  Ultrasound interpretation:  Needle was seen in close proximity to nerve.  Local anesthetic seen surrounding area of nerve.  No abnormalities noted.  Still image obtained.

## 2021-06-14 NOTE — ANESTHESIA PROCEDURE NOTES
Peripheral Block      Patient reassessed immediately prior to procedure    Patient location during procedure: pre-op  Start time: 6/14/2021 7:33 AM  Stop time: 6/14/2021 7:36 AM  Reason for block: at surgeon's request and post-op pain management  Performed by  Anesthesiologist: Kalpesh Scanlon MD  Preanesthetic Checklist  Completed: patient identified, IV checked, site marked, risks and benefits discussed, surgical consent, monitors and equipment checked, pre-op evaluation and timeout performed  Prep:  Pt Position: supine  Sterile barriers:gloves and cap  Prep: ChloraPrep  Patient monitoring: blood pressure monitoring, continuous pulse oximetry and EKG  Procedure  Sedation:yes    Guidance:ultrasound guided  ULTRASOUND INTERPRETATION.  Using ultrasound guidance a 21 G gauge needle was placed in close proximity to the femoral nerve, at which point, under ultrasound guidance anesthetic was injected in the area of the nerve and spread of the anesthesia was seen on ultrasound in close proximity thereto.  There were no abnormalities seen on ultrasound; a digital image was taken; and the patient tolerated the procedure with no complications. Images:still images obtained    Laterality:right  Block Type:adductor canal block  Injection Technique:single-shot  Needle Type:echogenic  Needle Gauge:21 G  Resistance on Injection: none    Medications Used: bupivacaine PF (MARCAINE) 0.25 % injection, 20 mL  Med admintered at 6/14/2021 7:36 AM      Post Assessment  Injection Assessment: negative aspiration for heme, no paresthesia on injection and incremental injection  Patient Tolerance:comfortable throughout block  Complications:no  Additional Notes  Ultrasound interpretation:  Needle was seen in close proximity to nerve.  Local anesthetic seen surrounding area of nerve.  No abnormalities noted.  Still image obtained.

## 2021-06-14 NOTE — ANESTHESIA PREPROCEDURE EVALUATION
Anesthesia Evaluation     Patient summary reviewed and Nursing notes reviewed   NPO Solid Status: > 8 hours  NPO Liquid Status: > 2 hours           Airway   Mallampati: II  TM distance: >3 FB  Neck ROM: full  Dental - normal exam     Pulmonary - normal exam    breath sounds clear to auscultation  (+) asthma,sleep apnea,   Cardiovascular - normal exam    ECG reviewed  Rhythm: regular  Rate: normal    (+) hypertension, valvular problems/murmurs (PFO), hyperlipidemia,   (-) angina, orthopnea, PND, KOO    ROS comment: ABNORMAL ECG -  Sinus rhythm  Ventricular bigeminy  NO PRIOR TRACING AVAILABLE FOR COMPARISON    · The left ventricular cavity is borderline dilated.  · Estimated EF appears to be in the range of 56 - 60%  · Left ventricular wall thickness is consistent with borderline concentric hypertrophy.  · Left ventricular diastolic function is normal.  · Normal right ventricular cavity size and systolic function noted.  · Left atrial cavity size is mildly dilated  · There is a small amount of color flow across the atrial septum in multiple views, most likely consistent with a small PFO.  · Mild tricuspid valve regurgitation is present  · Calculated right ventricular systolic pressure from tricuspid regurgitation is 33 mmHg.  · There is no evidence of pericardial effusion    Neuro/Psych- negative ROS  GI/Hepatic/Renal/Endo    (+)  GERD,      Musculoskeletal (-) negative ROS    Abdominal    Substance History - negative use     OB/GYN negative ob/gyn ROS         Other - negative ROS                       Anesthesia Plan    ASA 3     general   (Popliteal and adductor canal block for POPC)  intravenous induction     Anesthetic plan, all risks, benefits, and alternatives have been provided, discussed and informed consent has been obtained with: patient.

## 2021-06-14 NOTE — ANESTHESIA POSTPROCEDURE EVALUATION
Patient: Jose Montanez    Procedure Summary     Date: 06/14/21 Room / Location:  [facility]  /  [facility]    Anesthesia Start: 0740 Anesthesia Stop: 1012    Procedure: RIGHT POSTERIOR TIBIAL TENDON DEBRIDEMENT FLEXOR DIGITORUM LONGUS TRANSFER COTTON/NIKITA OSTEOTOMIES MEDIAL DISPLACEMENT CALCANEAL OSTEOTOMY ACHILLES LENGTHENING( 2HRS) (Right Ankle) Diagnosis:     Surgeons: Ki Bess Jr., MD Provider: Kalpesh Scanlon MD    Anesthesia Type: general ASA Status: 3          Anesthesia Type: general    Vitals  Vitals Value Taken Time   /89 06/14/21 1045   Temp 36.4 °C (97.5 °F) 06/14/21 1045   Pulse 87 06/14/21 1051   Resp 16 06/14/21 1045   SpO2 98 % 06/14/21 1052   Vitals shown include unvalidated device data.        Post Anesthesia Care and Evaluation    Patient location during evaluation: PACU  Patient participation: complete - patient participated  Level of consciousness: awake  Pain management: adequate  Airway patency: patent  Anesthetic complications: No anesthetic complications    Cardiovascular status: acceptable  Respiratory status: acceptable  Hydration status: acceptable    Comments: /89 (BP Location: Left arm, Patient Position: Lying)   Pulse 74   Temp 36.4 °C (97.5 °F) (Temporal)   Resp 16   SpO2 97%

## 2021-06-14 NOTE — ANESTHESIA PROCEDURE NOTES
Airway  Urgency: elective    Date/Time: 6/14/2021 7:47 AM  Airway not difficult    General Information and Staff    Patient location during procedure: OR  Anesthesiologist: Kalpesh Scanlon MD  CRNA: Yady Meyers CRNA    Indications and Patient Condition  Indications for airway management: airway protection    Preoxygenated: yes  MILS maintained throughout  Mask difficulty assessment: 0 - not attempted    Final Airway Details  Final airway type: supraglottic airway      Successful airway: classic  Size 5    Number of attempts at approach: 1  Assessment: lips, teeth, and gum same as pre-op and atraumatic intubation

## 2021-06-15 VITALS
SYSTOLIC BLOOD PRESSURE: 128 MMHG | OXYGEN SATURATION: 94 % | DIASTOLIC BLOOD PRESSURE: 72 MMHG | HEIGHT: 70 IN | BODY MASS INDEX: 32.35 KG/M2 | WEIGHT: 226 LBS | TEMPERATURE: 97.6 F | HEART RATE: 73 BPM | RESPIRATION RATE: 16 BRPM

## 2021-06-15 PROCEDURE — 97161 PT EVAL LOW COMPLEX 20 MIN: CPT

## 2021-06-15 PROCEDURE — 97530 THERAPEUTIC ACTIVITIES: CPT

## 2021-06-15 PROCEDURE — 25010000003 CEFAZOLIN IN DEXTROSE 2-4 GM/100ML-% SOLUTION: Performed by: ORTHOPAEDIC SURGERY

## 2021-06-15 RX ORDER — ASPIRIN 325 MG
325 TABLET, DELAYED RELEASE (ENTERIC COATED) ORAL DAILY
Qty: 30 TABLET | Refills: 0 | Status: SHIPPED | OUTPATIENT
Start: 2021-06-15

## 2021-06-15 RX ORDER — ONDANSETRON 4 MG/1
4 TABLET, FILM COATED ORAL EVERY 8 HOURS PRN
Qty: 20 TABLET | Refills: 0 | Status: SHIPPED | OUTPATIENT
Start: 2021-06-15 | End: 2021-07-15

## 2021-06-15 RX ORDER — OXYCODONE AND ACETAMINOPHEN 7.5; 325 MG/1; MG/1
1 TABLET ORAL EVERY 4 HOURS PRN
Qty: 40 TABLET | Refills: 0 | Status: SHIPPED | OUTPATIENT
Start: 2021-06-15 | End: 2021-08-02

## 2021-06-15 RX ADMIN — DOCUSATE SODIUM 50MG AND SENNOSIDES 8.6MG 2 TABLET: 8.6; 5 TABLET, FILM COATED ORAL at 08:57

## 2021-06-15 RX ADMIN — PANTOPRAZOLE SODIUM 40 MG: 40 TABLET, DELAYED RELEASE ORAL at 06:03

## 2021-06-15 RX ADMIN — SODIUM CHLORIDE, PRESERVATIVE FREE 10 ML: 5 INJECTION INTRAVENOUS at 08:57

## 2021-06-15 RX ADMIN — LISINOPRIL 10 MG: 10 TABLET ORAL at 08:57

## 2021-06-15 RX ADMIN — OXYCODONE AND ACETAMINOPHEN 1 TABLET: 325; 10 TABLET ORAL at 11:21

## 2021-06-15 RX ADMIN — ASPIRIN 325 MG: 325 TABLET, COATED ORAL at 08:57

## 2021-06-15 RX ADMIN — CEFAZOLIN SODIUM 2 G: 2 INJECTION, SOLUTION INTRAVENOUS at 06:03

## 2021-08-02 ENCOUNTER — OFFICE VISIT (OUTPATIENT)
Dept: FAMILY MEDICINE CLINIC | Facility: CLINIC | Age: 56
End: 2021-08-02

## 2021-08-02 VITALS
DIASTOLIC BLOOD PRESSURE: 70 MMHG | HEART RATE: 65 BPM | WEIGHT: 227.6 LBS | SYSTOLIC BLOOD PRESSURE: 128 MMHG | BODY MASS INDEX: 32.58 KG/M2 | HEIGHT: 70 IN | OXYGEN SATURATION: 97 % | TEMPERATURE: 97.1 F

## 2021-08-02 DIAGNOSIS — Z12.11 ENCOUNTER FOR SCREENING COLONOSCOPY: ICD-10-CM

## 2021-08-02 DIAGNOSIS — K21.9 GASTROESOPHAGEAL REFLUX DISEASE, UNSPECIFIED WHETHER ESOPHAGITIS PRESENT: ICD-10-CM

## 2021-08-02 DIAGNOSIS — Z12.5 SCREENING PSA (PROSTATE SPECIFIC ANTIGEN): ICD-10-CM

## 2021-08-02 DIAGNOSIS — Z00.00 HEALTH CARE MAINTENANCE: Primary | ICD-10-CM

## 2021-08-02 PROCEDURE — 99396 PREV VISIT EST AGE 40-64: CPT | Performed by: FAMILY MEDICINE

## 2021-08-02 RX ORDER — LISINOPRIL 10 MG/1
10 TABLET ORAL DAILY
Qty: 90 TABLET | Refills: 3 | Status: SHIPPED | OUTPATIENT
Start: 2021-08-02 | End: 2022-08-22

## 2021-08-02 NOTE — PROGRESS NOTES
"Chief Complaint  Annual Exam    Subjective          Jose Montanez presents to [facility]  History of Present Illness     Here for annual healthcare maintenance visit.  Never smoker.  Rare alcohol use.  He is not been exercising as much and some orthopedic surgery right ankle.  He had a tendon repair.  He checks his blood pressure at home and runs mostly normal.  He continues lisinopril 10 mg daily.  His CMP and a CBC about a month ago that was within normal limits, preoperative.  He has multiple PVCs with a history of asymptomatic palpitations.  He had a echocardiogram which revealed a small PFO few months ago.  He had a stress test that revealed no pathological rhythm or evidence of ischemia.  He is having no chest pain or chest pressure.  He has some dysphagia if he stops his omeprazole for couple weeks.  He has had no changes in his stool.  No change in urination.  He has a urologist that checks his prostate exams.  Negative depression screen.  His Covid vaccine is up-to-date.  He is overdue for a colonoscopy.  He has had previous negative Cologuard's.    Objective   Vital Signs:   /70   Pulse 65   Temp 97.1 °F (36.2 °C) (Temporal)   Ht 177.8 cm (70\")   Wt 103 kg (227 lb 9.6 oz)   SpO2 97%   BMI 32.66 kg/m²     Physical Exam  Vitals and nursing note reviewed.   Constitutional:       General: He is not in acute distress.     Appearance: He is well-developed.   HENT:      Head: Atraumatic.      Right Ear: Tympanic membrane, ear canal and external ear normal.      Left Ear: Tympanic membrane, ear canal and external ear normal.      Nose: Nose normal.      Mouth/Throat:      Pharynx: No oropharyngeal exudate.   Eyes:      General: No scleral icterus.        Right eye: No discharge.         Left eye: No discharge.      Conjunctiva/sclera: Conjunctivae normal.   Neck:      Thyroid: No thyromegaly.   Cardiovascular:      Rate and Rhythm: Normal rate and regular rhythm.    "   Heart sounds: Normal heart sounds.   Pulmonary:      Effort: Pulmonary effort is normal. No respiratory distress.      Breath sounds: Normal breath sounds. No wheezing or rales.   Abdominal:      General: Bowel sounds are normal. There is no distension.      Palpations: Abdomen is soft.      Tenderness: There is no abdominal tenderness.   Musculoskeletal:      Cervical back: Normal range of motion and neck supple.   Lymphadenopathy:      Cervical: No cervical adenopathy.   Skin:     General: Skin is warm and dry.      Findings: No rash.   Neurological:      Mental Status: He is alert and oriented to person, place, and time.      Motor: No abnormal muscle tone.      Coordination: Coordination normal.   Psychiatric:         Behavior: Behavior normal.         Thought Content: Thought content normal.         Judgment: Judgment normal.        Result Review :   The following data was reviewed by: Enrico Santizo MD on 08/02/2021:  Common labs    Common Labsle 6/7/21 6/7/21    1302 1303   Glucose 94    BUN 17    Creatinine 1.08    eGFR Non African Am 71    Sodium 140    Potassium 4.5    Chloride 106    Calcium 9.1    Albumin 4.50    Total Bilirubin 0.9    Alkaline Phosphatase 74    AST (SGOT) 26    ALT (SGPT) 27    WBC  5.05   Hemoglobin  17.2   Hematocrit  49.7   Platelets  136 (A)   (A) Abnormal value       Comments are available for some flowsheets but are not being displayed.                     Assessment and Plan    Diagnoses and all orders for this visit:    1. Health care maintenance (Primary)  -     Lipid Panel  -     PSA Screen    2. Encounter for screening colonoscopy  -     Ambulatory Referral For Screening Colonoscopy    3. Gastroesophageal reflux disease, unspecified whether esophagitis present  -     Ambulatory referral for Screening EGD    4. Screening PSA (prostate specific antigen)  -     PSA Screen    Other orders  -     lisinopril (PRINIVIL,ZESTRIL) 10 MG tablet; Take 1 tablet by mouth Daily.   Dispense: 90 tablet; Refill: 3        Annual healthcare maintenance visit.    Immunizations.  COVID-19 vaccine up-to-date.    Colon cancer screening.  Colonoscopy recommended.  Has been ordered.  Patient states he wants to have one done.    GERD.  He will get some dysphagia when he stops his PPI.  Patient would like to get a EGD.  This is a good idea.  Referral has been placed.    Prostate cancer screening.  Checking PSA.  No significant  symptoms.  Patient has his prostate checked by urologist.    Hypertension.  Elevated blood pressure initially but repeat is normal.  He will be checking his blood pressure at home.  Continue lisinopril.  Recent CMP within normal limits.  Checking lipid panel today along with a PSA.    Preventative health practices discussed in some detail including regular exercise.  I will see him back in 1 year sooner as needed.      Follow Up   No follow-ups on file.  Patient was given instructions and counseling regarding his condition or for health maintenance advice. Please see specific information pulled into the AVS if appropriate.

## 2021-08-03 LAB
CHOLEST SERPL-MCNC: 197 MG/DL (ref 0–200)
HDLC SERPL-MCNC: 38 MG/DL (ref 40–60)
LDLC SERPL CALC-MCNC: 138 MG/DL (ref 0–100)
PSA SERPL-MCNC: 2.07 NG/ML (ref 0–4)
TRIGL SERPL-MCNC: 118 MG/DL (ref 0–150)
VLDLC SERPL CALC-MCNC: 21 MG/DL (ref 5–40)

## 2022-01-15 ENCOUNTER — IMMUNIZATION (OUTPATIENT)
Dept: VACCINE CLINIC | Facility: HOSPITAL | Age: 57
End: 2022-01-15

## 2022-01-15 PROCEDURE — 91300 HC SARSCOV02 VAC 30MCG/0.3ML IM: CPT | Performed by: INTERNAL MEDICINE

## 2022-01-15 PROCEDURE — 0004A HC ADM SARSCOV2 30MCG/0.3ML BOOSTER: CPT | Performed by: INTERNAL MEDICINE

## 2022-01-24 ENCOUNTER — OFFICE VISIT (OUTPATIENT)
Dept: SURGERY | Facility: CLINIC | Age: 57
End: 2022-01-24

## 2022-01-24 VITALS — BODY MASS INDEX: 34.07 KG/M2 | WEIGHT: 238 LBS | HEIGHT: 70 IN

## 2022-01-24 DIAGNOSIS — K40.90 LEFT INGUINAL HERNIA: ICD-10-CM

## 2022-01-24 DIAGNOSIS — K21.9 GASTROESOPHAGEAL REFLUX DISEASE, UNSPECIFIED WHETHER ESOPHAGITIS PRESENT: Primary | ICD-10-CM

## 2022-01-24 DIAGNOSIS — Z12.11 SCREEN FOR COLON CANCER: ICD-10-CM

## 2022-01-24 PROCEDURE — 99204 OFFICE O/P NEW MOD 45 MIN: CPT | Performed by: SURGERY

## 2022-01-24 NOTE — PROGRESS NOTES
SUMMARY (A/P):    56-year-old gentleman with:  1.  Symptomatic left inguinal hernia.  He wishes to proceed with laparoscopic left inguinal hernia repair.  Understands the nature of the procedure and the risks including but not limited to bleeding, infection, conversion to open procedure, use of mesh, and recurrence.  We will schedule after endoscopic evaluation completed.  2.  Longstanding history of moderately severe gastroesophageal reflux disease symptoms and history of bleeding ulcer several decades ago.  Reflux symptoms are reasonably well controlled with proton pump inhibitor.  Does get some breakthrough reflux, particularly if he eats late.  Lastly, if he comes off proton pump inhibitors he develops solid food dysphagia.  With this presentation, EGD is warranted.  Will be done at same time as colonoscopy.  3.  Age 56, no prior screening colonoscopy.  No family history of colon cancer.  We will schedule colonoscopy and EGD simultaneously.      CC:    Hernia and GERD    HPI:    56-year-old gentleman with several year history of intermittent mild left inguinal pain associated with palpable and visible protrusion.    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  Also has long history of moderately severe gastroesophageal reflux disease symptoms requiring regular use of proton pump inhibitors.  With proton pump inhibitors, he has good symptomatic control overall except if he eats late.  If he comes off proton pump inhibitors for more than a week or 2 he also develops solid food dysphagia.    Lastly, he has not had prior screening colonoscopy.  No family history of colon cancer.    ENDOSCOPY:   • No prior colonoscopies    PREVIOUS ABDOMINAL SURGERY    • None    PMH:    • Hypertension  • Sleep apnea  • PVCs (asymptomatic), previously worked up by Dr. Anthony    ALLERGIES:   • None    MEDICATIONS:    • Lisinopril 10 mg p.o. daily    FAMILY HISTORY:    • Colorectal cancer: Negative    SOCIAL HISTORY:   • Denies tobacco use  • Occasional alcohol use    PHYSICAL EXAM:   • Constitutional: Well-developed well-nourished, no acute distress  • Vital signs:  o Weight 238 pounds  o Height 70 inches  o BMI 34.1  • Respiratory: Normal inspiratory effort  • Cardiovascular: Regular rate  • Gastrointestinal: Soft, nontender  • Genitourinary: Moderately large partially reducible left inguinal hernia extending to scrotum, no evidence of right inguinal hernia  • Psychiatric: Alert and oriented ×3, normal affect     SIMIN RASHID M.D.

## 2022-02-15 ENCOUNTER — LAB (OUTPATIENT)
Dept: LAB | Facility: HOSPITAL | Age: 57
End: 2022-02-15

## 2022-02-15 DIAGNOSIS — K21.9 GASTROESOPHAGEAL REFLUX DISEASE, UNSPECIFIED WHETHER ESOPHAGITIS PRESENT: ICD-10-CM

## 2022-02-15 DIAGNOSIS — Z12.11 SCREEN FOR COLON CANCER: ICD-10-CM

## 2022-02-15 PROCEDURE — C9803 HOPD COVID-19 SPEC COLLECT: HCPCS

## 2022-02-15 PROCEDURE — U0004 COV-19 TEST NON-CDC HGH THRU: HCPCS

## 2022-02-16 LAB — SARS-COV-2 ORF1AB RESP QL NAA+PROBE: NOT DETECTED

## 2022-02-17 ENCOUNTER — ANESTHESIA EVENT (OUTPATIENT)
Dept: GASTROENTEROLOGY | Facility: HOSPITAL | Age: 57
End: 2022-02-17

## 2022-02-17 ENCOUNTER — ANESTHESIA (OUTPATIENT)
Dept: GASTROENTEROLOGY | Facility: HOSPITAL | Age: 57
End: 2022-02-17

## 2022-02-17 ENCOUNTER — HOSPITAL ENCOUNTER (OUTPATIENT)
Facility: HOSPITAL | Age: 57
Setting detail: HOSPITAL OUTPATIENT SURGERY
Discharge: HOME | End: 2022-02-17
Attending: SURGERY | Admitting: SURGERY

## 2022-02-17 VITALS
BODY MASS INDEX: 33.21 KG/M2 | WEIGHT: 232 LBS | HEIGHT: 70 IN | OXYGEN SATURATION: 95 % | SYSTOLIC BLOOD PRESSURE: 125 MMHG | DIASTOLIC BLOOD PRESSURE: 92 MMHG | HEART RATE: 81 BPM | RESPIRATION RATE: 16 BRPM

## 2022-02-17 DIAGNOSIS — Z12.11 SCREEN FOR COLON CANCER: ICD-10-CM

## 2022-02-17 DIAGNOSIS — K21.9 GASTROESOPHAGEAL REFLUX DISEASE, UNSPECIFIED WHETHER ESOPHAGITIS PRESENT: ICD-10-CM

## 2022-02-17 PROCEDURE — 25010000002 PROPOFOL 10 MG/ML EMULSION: Performed by: NURSE ANESTHETIST, CERTIFIED REGISTERED

## 2022-02-17 PROCEDURE — 25010000002 GLUCAGON (HUMAN RECOMBINANT) 1 MG RECONSTITUTED SOLUTION: Performed by: SURGERY

## 2022-02-17 PROCEDURE — 45385 COLONOSCOPY W/LESION REMOVAL: CPT | Performed by: SURGERY

## 2022-02-17 PROCEDURE — 45380 COLONOSCOPY AND BIOPSY: CPT | Performed by: SURGERY

## 2022-02-17 PROCEDURE — 88305 TISSUE EXAM BY PATHOLOGIST: CPT | Performed by: SURGERY

## 2022-02-17 PROCEDURE — 43235 EGD DIAGNOSTIC BRUSH WASH: CPT | Performed by: SURGERY

## 2022-02-17 RX ORDER — SODIUM CHLORIDE 0.9 % (FLUSH) 0.9 %
3 SYRINGE (ML) INJECTION EVERY 12 HOURS SCHEDULED
Status: DISCONTINUED | OUTPATIENT
Start: 2022-02-17 | End: 2022-02-17 | Stop reason: HOSPADM

## 2022-02-17 RX ORDER — SODIUM CHLORIDE, SODIUM LACTATE, POTASSIUM CHLORIDE, CALCIUM CHLORIDE 600; 310; 30; 20 MG/100ML; MG/100ML; MG/100ML; MG/100ML
30 INJECTION, SOLUTION INTRAVENOUS CONTINUOUS PRN
Status: DISCONTINUED | OUTPATIENT
Start: 2022-02-17 | End: 2022-02-17 | Stop reason: HOSPADM

## 2022-02-17 RX ORDER — PROPOFOL 10 MG/ML
VIAL (ML) INTRAVENOUS CONTINUOUS PRN
Status: DISCONTINUED | OUTPATIENT
Start: 2022-02-17 | End: 2022-02-17 | Stop reason: SURG

## 2022-02-17 RX ORDER — LIDOCAINE HYDROCHLORIDE 20 MG/ML
INJECTION, SOLUTION INFILTRATION; PERINEURAL AS NEEDED
Status: DISCONTINUED | OUTPATIENT
Start: 2022-02-17 | End: 2022-02-17 | Stop reason: SURG

## 2022-02-17 RX ORDER — PROPOFOL 10 MG/ML
VIAL (ML) INTRAVENOUS AS NEEDED
Status: DISCONTINUED | OUTPATIENT
Start: 2022-02-17 | End: 2022-02-17 | Stop reason: SURG

## 2022-02-17 RX ORDER — SODIUM CHLORIDE 0.9 % (FLUSH) 0.9 %
10 SYRINGE (ML) INJECTION AS NEEDED
Status: DISCONTINUED | OUTPATIENT
Start: 2022-02-17 | End: 2022-02-17 | Stop reason: HOSPADM

## 2022-02-17 RX ADMIN — LIDOCAINE HYDROCHLORIDE 100 MG: 20 INJECTION, SOLUTION INFILTRATION; PERINEURAL at 13:55

## 2022-02-17 RX ADMIN — Medication 200 MCG/KG/MIN: at 13:55

## 2022-02-17 RX ADMIN — PROPOFOL 150 MG: 10 INJECTION, EMULSION INTRAVENOUS at 13:55

## 2022-02-17 RX ADMIN — SODIUM CHLORIDE, POTASSIUM CHLORIDE, SODIUM LACTATE AND CALCIUM CHLORIDE: 600; 310; 30; 20 INJECTION, SOLUTION INTRAVENOUS at 13:50

## 2022-02-17 NOTE — ANESTHESIA POSTPROCEDURE EVALUATION
"Patient: Jose Montanez    Procedure Summary     Date: 02/17/22 Room / Location:  [facility]    Anesthesia Start: 1350 Anesthesia Stop: 1420    Procedures:       COLONOSCOPY  to cecum with hot snare/cold bx polypectomies (N/A )      ESOPHAGOGASTRODUODENOSCOPY (N/A Esophagus) Diagnosis:       Gastroesophageal reflux disease, unspecified whether esophagitis present      Screen for colon cancer      (Gastroesophageal reflux disease, unspecified whether esophagitis present [K21.9])      (Screen for colon cancer [Z12.11])    Surgeons: Luis Eduardo Hudson MD Provider: Kirby Ray MD    Anesthesia Type: MAC ASA Status: 3          Anesthesia Type: MAC    Vitals  Vitals Value Taken Time   /83 02/17/22 1431   Temp     Pulse 77 02/17/22 1431   Resp 16 02/17/22 1431   SpO2 93 % 02/17/22 1431           Post Anesthesia Care and Evaluation    Patient location during evaluation: bedside  Patient participation: complete - patient participated  Level of consciousness: awake and alert  Pain management: adequate  Airway patency: patent  Anesthetic complications: No anesthetic complications    Cardiovascular status: acceptable  Respiratory status: acceptable  Hydration status: acceptable    Comments: /83 (BP Location: Left arm, Patient Position: Lying)   Pulse 77   Resp 16   Ht 177.8 cm (70\")   Wt 105 kg (232 lb)   SpO2 93%   BMI 33.29 kg/m²       "

## 2022-02-17 NOTE — ANESTHESIA PREPROCEDURE EVALUATION
Anesthesia Evaluation     Patient summary reviewed and Nursing notes reviewed   no history of anesthetic complications:  NPO Solid Status: > 8 hours  NPO Liquid Status: > 4 hours           Airway   Mallampati: II  Dental      Pulmonary - normal exam   (+) asthma,sleep apnea,   Cardiovascular - normal exam    (+) hypertension less than 2 medications, hyperlipidemia,       Neuro/Psych- negative ROS  GI/Hepatic/Renal/Endo    (+) obesity,  GERD,      Musculoskeletal     Abdominal    Substance History      OB/GYN          Other                        Anesthesia Plan    ASA 3     MAC     intravenous induction     Anesthetic plan, all risks, benefits, and alternatives have been provided, discussed and informed consent has been obtained with: patient.        CODE STATUS:

## 2022-02-17 NOTE — OP NOTE
PREOPERATIVE DIAGNOSIS:  • Gastroesophageal reflux disease  • Dysphagia  • Screening    POSTOPERATIVE DIAGNOSIS AND FINDINGS:  • Small sliding hiatal hernia  • Small cecal polyp  • Small sigmoid polyp  • Pandiverticulosis    PROCEDURE:  • EGD  • Colonoscopy to terminal ileum with:  o Snare polypectomy of cecal polyp  o Cold biopsy removal of sigmoid polyp    SURGEON:  Luis Eduardo Hudson MD    ANESTHESIA:  MAC    SPECIMEN(S):  Polyps    DESCRIPTION:  In decubitus position endoscope was inserted into the esophagus, stomach and duodenum. Antegrade and retroflex view of stomach performed.  First, second, and proximal third portions of duodenum grossly normal.  Gastric antrum, body, and retroflexed view of the stomach normal except for small sliding hiatal hernia.  GE junction remarkable only for small sliding hiatal hernia, no abnormalities of the esophageal mucosa.    Digital rectal exam was normal including normal prostate. Colonoscope inserted under direct visualization of lumen to cecum confirmed by visualization of ileocecal valve and appendiceal orifice.  Ileocecal valve intubated and terminal ileum grossly normal.  Scope slowly withdrawn circumferentially examining all mucosal surfaces.  There was a polypoid area of mucosa near the appendiceal orifice either representing polyp or hyperplastic mucosa, this was removed completely with a hot snare and retrieved, good hemostasis at the site.  Small polyp identified in the sigmoid colon removed completely with cold biopsy forceps.  Diverticulosis noted throughout the colon, most prominently within the sigmoid.  Tolerated well, stable to recovery.    RECOMMENDATION FOR FUTURE SURVEILLANCE:  To be determined    Luis Eduardo Hudson M.D.

## 2022-02-17 NOTE — DISCHARGE INSTRUCTIONS
For the next 24 hours patient needs to be with a responsible adult.    For 24 hours DO NOT drive, operate machinery, appliances, drink alcohol, make important decisions or sign legal documents.    Start with a light or bland diet if you are feeling sick to your stomach otherwise advance to regular diet as tolerated.    Follow recommendations on procedure report if provided by your doctor.    Call Dr Hudson for problems [phone]. Await pathology result in 7-10 days, Call Dr. Hudson to review result.    Problems may include but not limited to: large amounts of bleeding, trouble breathing, repeated vomiting, severe unrelieved pain, fever or chills.

## 2022-02-18 ENCOUNTER — DOCUMENTATION (OUTPATIENT)
Dept: SURGERY | Facility: CLINIC | Age: 57
End: 2022-02-18

## 2022-02-18 LAB
LAB AP CASE REPORT: NORMAL
LAB AP DIAGNOSIS COMMENT: NORMAL
PATH REPORT.FINAL DX SPEC: NORMAL
PATH REPORT.GROSS SPEC: NORMAL

## 2022-02-18 NOTE — PROGRESS NOTES
ENDOSCOPY FOLLOW UP NOTE    Colonoscopy 2/17/2022    Indication:  • Gastroesophageal reflux disease  • Dysphagia  • Screening    Findings:  • Small sliding hiatal hernia  • Small cecal polyp  • Small sigmoid polyp  • Pandiverticulosis    Pathology:  • Cecal polyp: Sessile serrated lesion  • Sigmoid polyp: Hyperplastic    Recommendations:  • I recommended 2-year surveillance colonoscopy based on the sessile serrated cecal polyp, particularly given that it was rather subtle and I think this area should be followed closely    Luis Eduardo Hudson M.D.

## 2022-05-01 DIAGNOSIS — E29.1 HYPOGONADISM MALE: Primary | ICD-10-CM

## 2022-05-02 ENCOUNTER — LAB (OUTPATIENT)
Dept: LAB | Facility: HOSPITAL | Age: 57
End: 2022-05-02

## 2022-05-02 ENCOUNTER — TRANSCRIBE ORDERS (OUTPATIENT)
Dept: ADMINISTRATIVE | Facility: HOSPITAL | Age: 57
End: 2022-05-02

## 2022-05-02 DIAGNOSIS — I10 ESSENTIAL HYPERTENSION, MALIGNANT: ICD-10-CM

## 2022-05-02 DIAGNOSIS — E55.9 AVITAMINOSIS D: ICD-10-CM

## 2022-05-02 DIAGNOSIS — E66.9 OBESITY, UNSPECIFIED CLASSIFICATION, UNSPECIFIED OBESITY TYPE, UNSPECIFIED WHETHER SERIOUS COMORBIDITY PRESENT: ICD-10-CM

## 2022-05-02 DIAGNOSIS — E29.1 HYPOGONADISM MALE: ICD-10-CM

## 2022-05-02 DIAGNOSIS — G47.33 OBSTRUCTIVE SLEEP APNEA (ADULT) (PEDIATRIC): ICD-10-CM

## 2022-05-02 DIAGNOSIS — K21.9 CHALASIA OF LOWER ESOPHAGEAL SPHINCTER: ICD-10-CM

## 2022-05-02 DIAGNOSIS — E29.1 3-OXO-5 ALPHA-STEROID DELTA 4-DEHYDROGENASE DEFICIENCY: ICD-10-CM

## 2022-05-02 DIAGNOSIS — D51.9 ANEMIA DUE TO VITAMIN B12 DEFICIENCY, UNSPECIFIED B12 DEFICIENCY TYPE: ICD-10-CM

## 2022-05-02 DIAGNOSIS — E66.9 OBESITY, UNSPECIFIED CLASSIFICATION, UNSPECIFIED OBESITY TYPE, UNSPECIFIED WHETHER SERIOUS COMORBIDITY PRESENT: Primary | ICD-10-CM

## 2022-05-02 LAB
ALBUMIN SERPL-MCNC: 4.8 G/DL (ref 3.5–5.2)
ALBUMIN/GLOB SERPL: 2 G/DL
ALP SERPL-CCNC: 89 U/L (ref 39–117)
ALT SERPL W P-5'-P-CCNC: 24 U/L (ref 1–41)
ANION GAP SERPL CALCULATED.3IONS-SCNC: 10.6 MMOL/L (ref 5–15)
AST SERPL-CCNC: 20 U/L (ref 1–40)
BASOPHILS # BLD AUTO: 0.05 10*3/MM3 (ref 0–0.2)
BASOPHILS NFR BLD AUTO: 0.9 % (ref 0–1.5)
BILIRUB SERPL-MCNC: 1.4 MG/DL (ref 0–1.2)
BUN SERPL-MCNC: 20 MG/DL (ref 6–20)
BUN/CREAT SERPL: 16.7 (ref 7–25)
CALCIUM SPEC-SCNC: 9.5 MG/DL (ref 8.6–10.5)
CHLORIDE SERPL-SCNC: 103 MMOL/L (ref 98–107)
CO2 SERPL-SCNC: 26.4 MMOL/L (ref 22–29)
CREAT SERPL-MCNC: 1.2 MG/DL (ref 0.76–1.27)
DEPRECATED RDW RBC AUTO: 40.1 FL (ref 37–54)
EGFRCR SERPLBLD CKD-EPI 2021: 71.4 ML/MIN/1.73
EOSINOPHIL # BLD AUTO: 0.24 10*3/MM3 (ref 0–0.4)
EOSINOPHIL NFR BLD AUTO: 4.5 % (ref 0.3–6.2)
ERYTHROCYTE [DISTWIDTH] IN BLOOD BY AUTOMATED COUNT: 13 % (ref 12.3–15.4)
ESTRADIOL SERPL HS-MCNC: <5 PG/ML
GLOBULIN UR ELPH-MCNC: 2.4 GM/DL
GLUCOSE SERPL-MCNC: 91 MG/DL (ref 65–99)
HAV IGM SERPL QL IA: NORMAL
HBV CORE IGM SERPL QL IA: NORMAL
HBV SURFACE AG SERPL QL IA: NORMAL
HCT VFR BLD AUTO: 54.5 % (ref 37.5–51)
HCV AB SER DONR QL: NORMAL
HGB BLD-MCNC: 18.5 G/DL (ref 13–17.7)
IMM GRANULOCYTES # BLD AUTO: 0.02 10*3/MM3 (ref 0–0.05)
IMM GRANULOCYTES NFR BLD AUTO: 0.4 % (ref 0–0.5)
LYMPHOCYTES # BLD AUTO: 1.52 10*3/MM3 (ref 0.7–3.1)
LYMPHOCYTES NFR BLD AUTO: 28.8 % (ref 19.6–45.3)
MCH RBC QN AUTO: 29.1 PG (ref 26.6–33)
MCHC RBC AUTO-ENTMCNC: 33.9 G/DL (ref 31.5–35.7)
MCV RBC AUTO: 85.7 FL (ref 79–97)
MONOCYTES # BLD AUTO: 0.46 10*3/MM3 (ref 0.1–0.9)
MONOCYTES NFR BLD AUTO: 8.7 % (ref 5–12)
NEUTROPHILS NFR BLD AUTO: 2.99 10*3/MM3 (ref 1.7–7)
NEUTROPHILS NFR BLD AUTO: 56.7 % (ref 42.7–76)
NRBC BLD AUTO-RTO: 0 /100 WBC (ref 0–0.2)
PLATELET # BLD AUTO: 143 10*3/MM3 (ref 140–450)
PMV BLD AUTO: 11.5 FL (ref 6–12)
POTASSIUM SERPL-SCNC: 4.3 MMOL/L (ref 3.5–5.2)
PROT SERPL-MCNC: 7.2 G/DL (ref 6–8.5)
RBC # BLD AUTO: 6.36 10*6/MM3 (ref 4.14–5.8)
SODIUM SERPL-SCNC: 140 MMOL/L (ref 136–145)
WBC NRBC COR # BLD: 5.28 10*3/MM3 (ref 3.4–10.8)

## 2022-05-02 PROCEDURE — 84402 ASSAY OF FREE TESTOSTERONE: CPT

## 2022-05-02 PROCEDURE — 80053 COMPREHEN METABOLIC PANEL: CPT

## 2022-05-02 PROCEDURE — 85025 COMPLETE CBC W/AUTO DIFF WBC: CPT

## 2022-05-02 PROCEDURE — 36415 COLL VENOUS BLD VENIPUNCTURE: CPT

## 2022-05-02 PROCEDURE — 84403 ASSAY OF TOTAL TESTOSTERONE: CPT

## 2022-05-02 PROCEDURE — 82642 DIHYDROTESTOSTERONE: CPT

## 2022-05-02 PROCEDURE — 80074 ACUTE HEPATITIS PANEL: CPT

## 2022-05-02 PROCEDURE — 82670 ASSAY OF TOTAL ESTRADIOL: CPT

## 2022-05-04 LAB
TESTOST FREE SERPL-MCNC: 13.7 PG/ML (ref 7.2–24)
TESTOST SERPL-MCNC: 784 NG/DL (ref 264–916)

## 2022-05-07 LAB — ANDROSTANOLONE SERPL-MCNC: 32 NG/DL

## 2022-06-23 ENCOUNTER — LAB (OUTPATIENT)
Dept: LAB | Facility: HOSPITAL | Age: 57
End: 2022-06-23

## 2022-06-23 ENCOUNTER — TRANSCRIBE ORDERS (OUTPATIENT)
Dept: ADMINISTRATIVE | Facility: HOSPITAL | Age: 57
End: 2022-06-23

## 2022-06-23 DIAGNOSIS — D51.9 ANEMIA DUE TO VITAMIN B12 DEFICIENCY, UNSPECIFIED B12 DEFICIENCY TYPE: ICD-10-CM

## 2022-06-23 DIAGNOSIS — E66.9 OBESITY, UNSPECIFIED CLASSIFICATION, UNSPECIFIED OBESITY TYPE, UNSPECIFIED WHETHER SERIOUS COMORBIDITY PRESENT: ICD-10-CM

## 2022-06-23 DIAGNOSIS — E29.1 3-OXO-5 ALPHA-STEROID DELTA 4-DEHYDROGENASE DEFICIENCY: ICD-10-CM

## 2022-06-23 DIAGNOSIS — I10 ESSENTIAL HYPERTENSION, MALIGNANT: ICD-10-CM

## 2022-06-23 DIAGNOSIS — K21.9 CHALASIA OF LOWER ESOPHAGEAL SPHINCTER: ICD-10-CM

## 2022-06-23 DIAGNOSIS — G47.33 OBSTRUCTIVE SLEEP APNEA (ADULT) (PEDIATRIC): ICD-10-CM

## 2022-06-23 DIAGNOSIS — E29.1 3-OXO-5 ALPHA-STEROID DELTA 4-DEHYDROGENASE DEFICIENCY: Primary | ICD-10-CM

## 2022-06-23 DIAGNOSIS — E55.9 AVITAMINOSIS D: ICD-10-CM

## 2022-06-23 LAB
BASOPHILS # BLD AUTO: 0.04 10*3/MM3 (ref 0–0.2)
BASOPHILS NFR BLD AUTO: 0.7 % (ref 0–1.5)
DEPRECATED RDW RBC AUTO: 39.7 FL (ref 37–54)
EOSINOPHIL # BLD AUTO: 0.31 10*3/MM3 (ref 0–0.4)
EOSINOPHIL NFR BLD AUTO: 5.5 % (ref 0.3–6.2)
ERYTHROCYTE [DISTWIDTH] IN BLOOD BY AUTOMATED COUNT: 13.3 % (ref 12.3–15.4)
HCT VFR BLD AUTO: 47.1 % (ref 37.5–51)
HGB BLD-MCNC: 17.3 G/DL (ref 13–17.7)
LYMPHOCYTES # BLD AUTO: 1.39 10*3/MM3 (ref 0.7–3.1)
LYMPHOCYTES NFR BLD AUTO: 24.6 % (ref 19.6–45.3)
MCH RBC QN AUTO: 30.6 PG (ref 26.6–33)
MCHC RBC AUTO-ENTMCNC: 36.7 G/DL (ref 31.5–35.7)
MCV RBC AUTO: 83.4 FL (ref 79–97)
MONOCYTES # BLD AUTO: 0.67 10*3/MM3 (ref 0.1–0.9)
MONOCYTES NFR BLD AUTO: 11.9 % (ref 5–12)
NEUTROPHILS NFR BLD AUTO: 3.17 10*3/MM3 (ref 1.7–7)
NEUTROPHILS NFR BLD AUTO: 56.2 % (ref 42.7–76)
PLATELET # BLD AUTO: 134 10*3/MM3 (ref 140–450)
PMV BLD AUTO: 11 FL (ref 6–12)
PSA SERPL-MCNC: 1.92 NG/ML (ref 0–4)
RBC # BLD AUTO: 5.65 10*6/MM3 (ref 4.14–5.8)
WBC NRBC COR # BLD: 5.64 10*3/MM3 (ref 3.4–10.8)

## 2022-06-23 PROCEDURE — 84402 ASSAY OF FREE TESTOSTERONE: CPT

## 2022-06-23 PROCEDURE — 84403 ASSAY OF TOTAL TESTOSTERONE: CPT

## 2022-06-23 PROCEDURE — 85025 COMPLETE CBC W/AUTO DIFF WBC: CPT

## 2022-06-23 PROCEDURE — 36415 COLL VENOUS BLD VENIPUNCTURE: CPT

## 2022-06-23 PROCEDURE — 82672 ASSAY OF ESTROGEN: CPT

## 2022-06-23 PROCEDURE — 82670 ASSAY OF TOTAL ESTRADIOL: CPT

## 2022-06-23 PROCEDURE — G0103 PSA SCREENING: HCPCS

## 2022-06-26 LAB — ESTROGEN SERPL-MCNC: 105 PG/ML (ref 56–213)

## 2022-06-28 LAB
TESTOST FREE MFR SERPL: 4.18 % (ref 1.5–4.2)
TESTOST FREE SERPL-MCNC: 33.92 NG/DL (ref 5–21)
TESTOST SERPL-MCNC: 811.5 NG/DL (ref 264–916)

## 2022-06-29 LAB — ESTRADIOL SERPL HS-MCNC: 5.7 PG/ML (ref 8–35)

## 2022-08-01 DIAGNOSIS — Z00.00 ANNUAL PHYSICAL EXAM: ICD-10-CM

## 2022-08-01 DIAGNOSIS — Z00.00 ANNUAL PHYSICAL EXAM: Primary | ICD-10-CM

## 2022-08-04 PROCEDURE — 80053 COMPREHEN METABOLIC PANEL: CPT | Performed by: FAMILY MEDICINE

## 2022-08-04 PROCEDURE — 36415 COLL VENOUS BLD VENIPUNCTURE: CPT

## 2022-08-04 PROCEDURE — 80061 LIPID PANEL: CPT | Performed by: FAMILY MEDICINE

## 2022-08-05 LAB
ALBUMIN SERPL-MCNC: 4.6 G/DL (ref 3.8–4.9)
ALBUMIN/GLOB SERPL: 1.8 {RATIO} (ref 1.2–2.2)
ALP SERPL-CCNC: 87 IU/L (ref 44–121)
ALT SERPL-CCNC: 23 IU/L (ref 0–44)
AST SERPL-CCNC: 27 IU/L (ref 0–40)
BILIRUB SERPL-MCNC: 2 MG/DL (ref 0–1.2)
BUN SERPL-MCNC: 19 MG/DL (ref 6–24)
BUN/CREAT SERPL: 15 (ref 9–20)
CALCIUM SERPL-MCNC: 9.5 MG/DL (ref 8.7–10.2)
CHLORIDE SERPL-SCNC: 103 MMOL/L (ref 96–106)
CHOLEST SERPL-MCNC: 198 MG/DL (ref 100–199)
CO2 SERPL-SCNC: 24 MMOL/L (ref 20–29)
CREAT SERPL-MCNC: 1.26 MG/DL (ref 0.76–1.27)
EGFRCR SERPLBLD CKD-EPI 2021: 67 ML/MIN/1.73
GLOBULIN SER CALC-MCNC: 2.5 G/DL (ref 1.5–4.5)
GLUCOSE SERPL-MCNC: 76 MG/DL (ref 65–99)
HDLC SERPL-MCNC: 38 MG/DL
LDLC SERPL CALC-MCNC: 144 MG/DL (ref 0–99)
POTASSIUM SERPL-SCNC: 4.4 MMOL/L (ref 3.5–5.2)
PROT SERPL-MCNC: 7.1 G/DL (ref 6–8.5)
SODIUM SERPL-SCNC: 141 MMOL/L (ref 134–144)
TRIGL SERPL-MCNC: 90 MG/DL (ref 0–149)
VLDLC SERPL CALC-MCNC: 16 MG/DL (ref 5–40)

## 2022-08-22 ENCOUNTER — OFFICE VISIT (OUTPATIENT)
Dept: FAMILY MEDICINE CLINIC | Facility: CLINIC | Age: 57
End: 2022-08-22

## 2022-08-22 VITALS
TEMPERATURE: 96.8 F | OXYGEN SATURATION: 97 % | WEIGHT: 232.9 LBS | BODY MASS INDEX: 33.34 KG/M2 | HEIGHT: 70 IN | DIASTOLIC BLOOD PRESSURE: 93 MMHG | SYSTOLIC BLOOD PRESSURE: 130 MMHG | HEART RATE: 82 BPM

## 2022-08-22 DIAGNOSIS — E66.01 MORBID (SEVERE) OBESITY DUE TO EXCESS CALORIES: ICD-10-CM

## 2022-08-22 DIAGNOSIS — I10 ESSENTIAL HYPERTENSION: ICD-10-CM

## 2022-08-22 DIAGNOSIS — Z00.00 HEALTH CARE MAINTENANCE: Primary | ICD-10-CM

## 2022-08-22 PROCEDURE — 99214 OFFICE O/P EST MOD 30 MIN: CPT | Performed by: FAMILY MEDICINE

## 2022-08-22 PROCEDURE — 99396 PREV VISIT EST AGE 40-64: CPT | Performed by: FAMILY MEDICINE

## 2022-08-22 RX ORDER — ANASTROZOLE 1 MG/1
TABLET ORAL
COMMUNITY
Start: 2022-07-05

## 2022-08-22 RX ORDER — PEN NEEDLE, DIABETIC 30 GX3/16"
1 NEEDLE, DISPOSABLE MISCELLANEOUS DAILY
Qty: 100 EACH | Refills: 2 | Status: SHIPPED | OUTPATIENT
Start: 2022-08-22

## 2022-08-22 RX ORDER — LISINOPRIL 10 MG/1
10 TABLET ORAL DAILY
Qty: 90 TABLET | Refills: 3 | Status: SHIPPED | OUTPATIENT
Start: 2022-08-22

## 2022-08-22 NOTE — PROGRESS NOTES
"Chief Complaint  Annual Exam    Subjective        Jose Montanez presents to [facility]  History of Present Illness     Annual healthcare maintenance visit.  Non-smoker.  Minimal alcohol use.  Has been watching his diet.  Trouble losing weight.  BMI 33.  Fairly history of obesity.  Intermediate risk coronary artery disease.  CT CAC score 2019 0.  He continues lisinopril 10 mg a day.  He states his blood pressure runs fairly normal at work.  He checks it a few times a week.  He was recently on testosterone for weight loss, went to a hormone clinic.  He is now on anastrozole for a few months until his previous testosterone wears out.  He was having gynecomastia.  He is interested in losing weight through medical means.  He has struggled with his weight for a number of years.  Normal renal function.  Normal electrolytes.  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        Objective   Vital Signs:  /93   Pulse 82   Temp 96.8 °F (36 °C) (Temporal)   Ht 177.8 cm (70\")   Wt 106 kg (232 lb 14.4 oz)   SpO2 97%   BMI 33.42 kg/m²   Estimated body mass index is 33.42 kg/m² as calculated from the following:    Height as of this encounter: 177.8 cm (70\").    Weight as of this encounter: 106 kg (232 lb 14.4 oz).          Physical Exam  Constitutional:       Appearance: Normal appearance. He is obese.   HENT:      Head: Atraumatic.      Mouth/Throat:      Pharynx: Oropharynx is clear. No oropharyngeal exudate or posterior oropharyngeal erythema.   Eyes:      General: No scleral icterus.     Conjunctiva/sclera: Conjunctivae normal.   Neck:      Thyroid: No thyroid mass, thyromegaly or thyroid tenderness.   Cardiovascular:      Rate and Rhythm: Normal rate and regular rhythm.      Pulses: Normal pulses.      Heart sounds: Normal heart sounds.   Pulmonary:      Effort: Pulmonary effort is normal.      Breath " sounds: Normal breath sounds.   Abdominal:      General: Abdomen is flat. There is no distension.      Palpations: Abdomen is soft. There is no mass.      Tenderness: There is no abdominal tenderness.      Hernia: No hernia is present.   Musculoskeletal:         General: Normal range of motion.      Cervical back: Normal range of motion and neck supple. No muscular tenderness.   Lymphadenopathy:      Cervical: No cervical adenopathy.   Skin:     General: Skin is warm and dry.      Findings: No rash.   Neurological:      General: No focal deficit present.      Mental Status: He is alert and oriented to person, place, and time.   Psychiatric:         Mood and Affect: Mood normal.        Result Review :                Assessment and Plan   Diagnoses and all orders for this visit:    1. Health care maintenance (Primary)    2. Essential hypertension  -     Comprehensive Metabolic Panel; Future    3. Morbid (severe) obesity due to excess calories (HCC)    Other orders  -     Liraglutide (SAXENDA) 18 MG/3ML injection pen; Inject 0.6mg under skin daily for week one, THEN 1.2mg daily for week two, THEN 1.8mg daily for week three, then 2.4mg daily for week four.  Dispense: 3 pen; Refill: 0  -     Insulin Pen Needle (Pen Needles) 32G X 4 MM misc; Inject 1 pen under the skin into the appropriate area as directed Daily. Use with victoza pen daily  Dispense: 100 each; Refill: 2  -     lisinopril (PRINIVIL,ZESTRIL) 10 MG tablet; Take 1 tablet by mouth Daily.  Dispense: 90 tablet; Refill: 3      Annual healthcare maintenance visit.    Immunizations.  Up-to-date COVID vaccination.    Colon cancer screening up-to-date.    Prostate cancer screening up-to-date through his urologist.    Obesity.  BMI greater than 30.  He is a good candidate for GLP-1 agonist.  He has no family history or personal history of medullary thyroid carcinoma or multiple endocrine neoplasia.  Good creatinine.  Good diet program.  He does need exercise more.   Discussed various modalities.  I recommend Saxenda.  Patient understands to not take this in the event of vomiting or diarrhea, could cause acute kidney injury in rare cases.  Otherwise benefits far outweigh risks.  Discussed nausea.  Starting a ramping up dose.  I will see him back in 3 months with labs prior.  No evidence of diabetes.    Hypertension.  Fair control.  To consider increasing dose next visit.    Hyperlipidemia.  Intermediate cardiovascular risk.  CT CAC score 0 2019.  Continue to monitor.    Preventative health practices discussed.                 Follow Up   No follow-ups on file.  Patient was given instructions and counseling regarding his condition or for health maintenance advice. Please see specific information pulled into the AVS if appropriate.

## 2022-08-24 RX ORDER — TIRZEPATIDE 2.5 MG/.5ML
2.5 INJECTION, SOLUTION SUBCUTANEOUS WEEKLY
Qty: 2 ML | Refills: 0 | Status: SHIPPED | OUTPATIENT
Start: 2022-08-24 | End: 2022-11-27

## 2022-11-21 ENCOUNTER — LAB (OUTPATIENT)
Dept: LAB | Facility: HOSPITAL | Age: 57
End: 2022-11-21

## 2022-11-21 ENCOUNTER — TRANSCRIBE ORDERS (OUTPATIENT)
Dept: ADMINISTRATIVE | Facility: HOSPITAL | Age: 57
End: 2022-11-21

## 2022-11-21 DIAGNOSIS — Z00.00 ROUTINE GENERAL MEDICAL EXAMINATION AT A HEALTH CARE FACILITY: ICD-10-CM

## 2022-11-21 DIAGNOSIS — Z00.00 ROUTINE GENERAL MEDICAL EXAMINATION AT A HEALTH CARE FACILITY: Primary | ICD-10-CM

## 2022-11-21 LAB — HCV AB SER DONR QL: NORMAL

## 2022-11-21 PROCEDURE — 36415 COLL VENOUS BLD VENIPUNCTURE: CPT

## 2022-11-21 PROCEDURE — 86803 HEPATITIS C AB TEST: CPT

## 2022-11-21 PROCEDURE — 80053 COMPREHEN METABOLIC PANEL: CPT | Performed by: FAMILY MEDICINE

## 2023-08-23 ENCOUNTER — LAB (OUTPATIENT)
Dept: LAB | Facility: HOSPITAL | Age: 58
End: 2023-08-23
Payer: COMMERCIAL

## 2023-08-23 DIAGNOSIS — Z77.21 EXPOSURE TO BLOOD: ICD-10-CM

## 2023-08-23 DIAGNOSIS — E78.2 MODERATE MIXED HYPERLIPIDEMIA NOT REQUIRING STATIN THERAPY: Primary | ICD-10-CM

## 2023-08-23 DIAGNOSIS — I10 ESSENTIAL HYPERTENSION: Chronic | ICD-10-CM

## 2023-08-23 DIAGNOSIS — Z77.21 EXPOSURE TO BLOOD: Primary | ICD-10-CM

## 2023-08-23 DIAGNOSIS — E78.2 MODERATE MIXED HYPERLIPIDEMIA NOT REQUIRING STATIN THERAPY: ICD-10-CM

## 2023-08-23 DIAGNOSIS — E29.1 HYPOGONADISM IN MALE: ICD-10-CM

## 2023-08-23 DIAGNOSIS — N40.0 BPH WITHOUT OBSTRUCTION/LOWER URINARY TRACT SYMPTOMS: ICD-10-CM

## 2023-08-23 LAB
ALBUMIN SERPL-MCNC: 4.4 G/DL (ref 3.5–5.2)
ALBUMIN/GLOB SERPL: 2.4 G/DL
ALP SERPL-CCNC: 74 U/L (ref 39–117)
ALT SERPL W P-5'-P-CCNC: 23 U/L (ref 1–41)
ANION GAP SERPL CALCULATED.3IONS-SCNC: 9.3 MMOL/L (ref 5–15)
AST SERPL-CCNC: 24 U/L (ref 1–40)
BILIRUB SERPL-MCNC: 1.1 MG/DL (ref 0–1.2)
BUN SERPL-MCNC: 18 MG/DL (ref 6–20)
BUN/CREAT SERPL: 14.1 (ref 7–25)
CALCIUM SPEC-SCNC: 9.3 MG/DL (ref 8.6–10.5)
CHLORIDE SERPL-SCNC: 104 MMOL/L (ref 98–107)
CHOLEST SERPL-MCNC: 197 MG/DL (ref 0–200)
CO2 SERPL-SCNC: 26.7 MMOL/L (ref 22–29)
CREAT SERPL-MCNC: 1.28 MG/DL (ref 0.76–1.27)
DEPRECATED RDW RBC AUTO: 40.2 FL (ref 37–54)
EGFRCR SERPLBLD CKD-EPI 2021: 65.7 ML/MIN/1.73
ERYTHROCYTE [DISTWIDTH] IN BLOOD BY AUTOMATED COUNT: 12.7 % (ref 12.3–15.4)
ESTRADIOL SERPL HS-MCNC: 21.1 PG/ML
GLOBULIN UR ELPH-MCNC: 1.8 GM/DL
GLUCOSE SERPL-MCNC: 94 MG/DL (ref 65–99)
HBA1C MFR BLD: 5.2 % (ref 4.8–5.6)
HCT VFR BLD AUTO: 47 % (ref 37.5–51)
HDLC SERPL-MCNC: 48 MG/DL (ref 40–60)
HGB BLD-MCNC: 15.7 G/DL (ref 13–17.7)
HIV 1+2 AB+HIV1 P24 AG SERPL QL IA: NORMAL
LDLC SERPL CALC-MCNC: 138 MG/DL (ref 0–100)
LDLC/HDLC SERPL: 2.86 {RATIO}
MCH RBC QN AUTO: 29.2 PG (ref 26.6–33)
MCHC RBC AUTO-ENTMCNC: 33.4 G/DL (ref 31.5–35.7)
MCV RBC AUTO: 87.5 FL (ref 79–97)
PLATELET # BLD AUTO: 124 10*3/MM3 (ref 140–450)
PMV BLD AUTO: 12.1 FL (ref 6–12)
POTASSIUM SERPL-SCNC: 4.4 MMOL/L (ref 3.5–5.2)
PROT SERPL-MCNC: 6.2 G/DL (ref 6–8.5)
PSA SERPL-MCNC: 1.92 NG/ML (ref 0–4)
RBC # BLD AUTO: 5.37 10*6/MM3 (ref 4.14–5.8)
SODIUM SERPL-SCNC: 140 MMOL/L (ref 136–145)
TRIGL SERPL-MCNC: 58 MG/DL (ref 0–150)
VLDLC SERPL-MCNC: 11 MG/DL (ref 5–40)
WBC NRBC COR # BLD: 5.37 10*3/MM3 (ref 3.4–10.8)

## 2023-08-23 PROCEDURE — 80053 COMPREHEN METABOLIC PANEL: CPT

## 2023-08-23 PROCEDURE — 83036 HEMOGLOBIN GLYCOSYLATED A1C: CPT

## 2023-08-23 PROCEDURE — 80061 LIPID PANEL: CPT

## 2023-08-23 PROCEDURE — 84402 ASSAY OF FREE TESTOSTERONE: CPT

## 2023-08-23 PROCEDURE — 84153 ASSAY OF PSA TOTAL: CPT

## 2023-08-23 PROCEDURE — 36415 COLL VENOUS BLD VENIPUNCTURE: CPT

## 2023-08-23 PROCEDURE — 84403 ASSAY OF TOTAL TESTOSTERONE: CPT

## 2023-08-23 PROCEDURE — G0432 EIA HIV-1/HIV-2 SCREEN: HCPCS

## 2023-08-23 PROCEDURE — 82670 ASSAY OF TOTAL ESTRADIOL: CPT

## 2023-08-23 PROCEDURE — 85027 COMPLETE CBC AUTOMATED: CPT

## 2023-08-28 RX ORDER — LISINOPRIL 10 MG/1
10 TABLET ORAL DAILY
Qty: 90 TABLET | Refills: 1 | Status: SHIPPED | OUTPATIENT
Start: 2023-08-28

## 2023-08-28 NOTE — TELEPHONE ENCOUNTER
Rx Refill Note  Requested Prescriptions     Pending Prescriptions Disp Refills    lisinopril (PRINIVIL,ZESTRIL) 10 MG tablet 90 tablet 1     Sig: Take 1 tablet by mouth Daily.      Last office visit with prescribing clinician: 8/22/2022   Last telemedicine visit with prescribing clinician: Visit date not found   Next office visit with prescribing clinician: Visit date not found                         Would you like a call back once the refill request has been completed: [] Yes [] No    If the office needs to give you a call back, can they leave a voicemail: [] Yes [] No    Marco Padilla  08/28/23, 16:25 EDT

## 2023-09-01 LAB
TESTOST FREE SERPL-MCNC: 5.5 PG/ML (ref 7.2–24)
TESTOST SERPL-MCNC: 298 NG/DL (ref 264–916)

## 2023-10-11 ENCOUNTER — TELEPHONE (OUTPATIENT)
Dept: CARDIOLOGY | Facility: CLINIC | Age: 58
End: 2023-10-11
Payer: COMMERCIAL

## 2023-10-11 NOTE — TELEPHONE ENCOUNTER
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  Not urgent

## 2023-11-13 ENCOUNTER — TELEPHONE (OUTPATIENT)
Dept: CARDIOLOGY | Facility: CLINIC | Age: 58
End: 2023-11-13
Payer: COMMERCIAL

## 2023-11-13 DIAGNOSIS — I49.3 PVC (PREMATURE VENTRICULAR CONTRACTION): Primary | ICD-10-CM

## 2023-11-13 NOTE — TELEPHONE ENCOUNTER
Karrie I had to R/S this patient for Doctor Anthony in doing so Dr. Montanez wanted to know if he should have an Echo done.  He states he is not having any problems just he needed to have his heart checked out.

## 2023-11-20 ENCOUNTER — DOCUMENTATION (OUTPATIENT)
Dept: UROLOGY | Facility: HOSPITAL | Age: 58
End: 2023-11-20
Payer: COMMERCIAL

## 2023-11-20 DIAGNOSIS — J06.0 LARYNGOPHARYNGITIS: Primary | ICD-10-CM

## 2023-11-20 RX ORDER — AMOXICILLIN AND CLAVULANATE POTASSIUM 875; 125 MG/1; MG/1
1 TABLET, FILM COATED ORAL 2 TIMES DAILY
Qty: 20 TABLET | Refills: 0 | Status: SHIPPED | OUTPATIENT
Start: 2023-11-20 | End: 2023-11-30

## 2023-12-19 DIAGNOSIS — E66.01 MORBID (SEVERE) OBESITY DUE TO EXCESS CALORIES: Primary | ICD-10-CM

## 2023-12-26 ENCOUNTER — HOSPITAL ENCOUNTER (OUTPATIENT)
Dept: CARDIOLOGY | Facility: HOSPITAL | Age: 58
Discharge: HOME | End: 2023-12-26
Admitting: NURSE PRACTITIONER
Payer: COMMERCIAL

## 2023-12-26 ENCOUNTER — OFFICE VISIT (OUTPATIENT)
Dept: CARDIOLOGY | Facility: CLINIC | Age: 58
End: 2023-12-26
Payer: COMMERCIAL

## 2023-12-26 VITALS
HEART RATE: 73 BPM | BODY MASS INDEX: 36.13 KG/M2 | SYSTOLIC BLOOD PRESSURE: 136 MMHG | DIASTOLIC BLOOD PRESSURE: 80 MMHG | HEIGHT: 70 IN | WEIGHT: 252.4 LBS

## 2023-12-26 VITALS
DIASTOLIC BLOOD PRESSURE: 100 MMHG | HEIGHT: 70 IN | SYSTOLIC BLOOD PRESSURE: 160 MMHG | HEART RATE: 82 BPM | WEIGHT: 232 LBS | BODY MASS INDEX: 33.21 KG/M2

## 2023-12-26 DIAGNOSIS — I49.3 PVC (PREMATURE VENTRICULAR CONTRACTION): Primary | ICD-10-CM

## 2023-12-26 DIAGNOSIS — I49.3 PVC (PREMATURE VENTRICULAR CONTRACTION): ICD-10-CM

## 2023-12-26 LAB
AORTIC ARCH: 3.2 CM
ASCENDING AORTA: 3.3 CM
BH CV ECHO MEAS - ACS: 2.31 CM
BH CV ECHO MEAS - AO MAX PG: 11.3 MMHG
BH CV ECHO MEAS - AO MEAN PG: 6 MMHG
BH CV ECHO MEAS - AO ROOT DIAM: 3.8 CM
BH CV ECHO MEAS - AO V2 MAX: 168 CM/SEC
BH CV ECHO MEAS - AO V2 VTI: 36.1 CM
BH CV ECHO MEAS - AVA(I,D): 2.19 CM2
BH CV ECHO MEAS - EDV(CUBED): 157.5 ML
BH CV ECHO MEAS - EDV(MOD-SP2): 148 ML
BH CV ECHO MEAS - EDV(MOD-SP4): 153 ML
BH CV ECHO MEAS - EF(MOD-BP): 53.2 %
BH CV ECHO MEAS - EF(MOD-SP2): 50.7 %
BH CV ECHO MEAS - EF(MOD-SP4): 54.2 %
BH CV ECHO MEAS - ESV(CUBED): 36.8 ML
BH CV ECHO MEAS - ESV(MOD-SP2): 73 ML
BH CV ECHO MEAS - ESV(MOD-SP4): 70 ML
BH CV ECHO MEAS - FS: 38.4 %
BH CV ECHO MEAS - IVS/LVPW: 1 CM
BH CV ECHO MEAS - IVSD: 1.2 CM
BH CV ECHO MEAS - LAT PEAK E' VEL: 7.9 CM/SEC
BH CV ECHO MEAS - LV DIASTOLIC VOL/BSA (35-75): 68.8 CM2
BH CV ECHO MEAS - LV MASS(C)D: 264.4 GRAMS
BH CV ECHO MEAS - LV MAX PG: 4 MMHG
BH CV ECHO MEAS - LV MEAN PG: 2 MMHG
BH CV ECHO MEAS - LV SYSTOLIC VOL/BSA (12-30): 31.5 CM2
BH CV ECHO MEAS - LV V1 MAX: 99.8 CM/SEC
BH CV ECHO MEAS - LV V1 VTI: 22.1 CM
BH CV ECHO MEAS - LVIDD: 5.4 CM
BH CV ECHO MEAS - LVIDS: 3.3 CM
BH CV ECHO MEAS - LVOT AREA: 3.6 CM2
BH CV ECHO MEAS - LVOT DIAM: 2.13 CM
BH CV ECHO MEAS - LVPWD: 1.2 CM
BH CV ECHO MEAS - MED PEAK E' VEL: 8.2 CM/SEC
BH CV ECHO MEAS - MR MAX PG: 33.8 MMHG
BH CV ECHO MEAS - MR MAX VEL: 290.5 CM/SEC
BH CV ECHO MEAS - MV A DUR: 0.11 SEC
BH CV ECHO MEAS - MV A MAX VEL: 85.7 CM/SEC
BH CV ECHO MEAS - MV DEC SLOPE: 294.4 CM/SEC2
BH CV ECHO MEAS - MV DEC TIME: 0.18 SEC
BH CV ECHO MEAS - MV E MAX VEL: 92.5 CM/SEC
BH CV ECHO MEAS - MV E/A: 1.08
BH CV ECHO MEAS - MV MAX PG: 3.7 MMHG
BH CV ECHO MEAS - MV MEAN PG: 1.87 MMHG
BH CV ECHO MEAS - MV P1/2T: 94.5 MSEC
BH CV ECHO MEAS - MV V2 VTI: 35.1 CM
BH CV ECHO MEAS - MVA(P1/2T): 2.33 CM2
BH CV ECHO MEAS - MVA(VTI): 2.25 CM2
BH CV ECHO MEAS - PA ACC TIME: 0.1 SEC
BH CV ECHO MEAS - PA V2 MAX: 142.5 CM/SEC
BH CV ECHO MEAS - PULM A REVS DUR: 0.11 SEC
BH CV ECHO MEAS - PULM A REVS VEL: 21 CM/SEC
BH CV ECHO MEAS - PULM DIAS VEL: 42.1 CM/SEC
BH CV ECHO MEAS - PULM S/D: 1.84
BH CV ECHO MEAS - PULM SYS VEL: 77.6 CM/SEC
BH CV ECHO MEAS - QP/QS: 0.58
BH CV ECHO MEAS - RAP SYSTOLE: 3 MMHG
BH CV ECHO MEAS - RV MAX PG: 1.59 MMHG
BH CV ECHO MEAS - RV V1 MAX: 63 CM/SEC
BH CV ECHO MEAS - RV V1 VTI: 13.8 CM
BH CV ECHO MEAS - RVOT DIAM: 2.06 CM
BH CV ECHO MEAS - RVSP: 23.6 MMHG
BH CV ECHO MEAS - SI(MOD-SP2): 33.7 ML/M2
BH CV ECHO MEAS - SI(MOD-SP4): 37.3 ML/M2
BH CV ECHO MEAS - SUP REN AO DIAM: 2.2 CM
BH CV ECHO MEAS - SV(LVOT): 79.1 ML
BH CV ECHO MEAS - SV(MOD-SP2): 75 ML
BH CV ECHO MEAS - SV(MOD-SP4): 83 ML
BH CV ECHO MEAS - SV(RVOT): 45.8 ML
BH CV ECHO MEAS - TAPSE (>1.6): 2.23 CM
BH CV ECHO MEAS - TR MAX PG: 20.6 MMHG
BH CV ECHO MEAS - TR MAX VEL: 226.7 CM/SEC
BH CV ECHO MEASUREMENTS AVERAGE E/E' RATIO: 11.49
BH CV XLRA - RV BASE: 3.3 CM
BH CV XLRA - RV LENGTH: 6.3 CM
BH CV XLRA - RV MID: 3 CM
BH CV XLRA - TDI S': 18.3 CM/SEC
LEFT ATRIUM VOLUME INDEX: 33.3 ML/M2
SINUS: 3.2 CM
STJ: 3 CM

## 2023-12-26 PROCEDURE — 93306 TTE W/DOPPLER COMPLETE: CPT

## 2023-12-26 NOTE — PROGRESS NOTES
Date of Office Visit: 23  Encounter Provider: Jose C Anthony MD  Place of Service: [facility] CARDIOLOGY  Patient Name: Jose Montanez  :1965  [id number]    Chief Complaint   Patient presents with    Hypertension   :     HPI: Jose Montanez is a 58 y.o. male who has had a history of frequent ventricular ectopy we looked at him in  and he had 28,000 PVCs in a 24-hour period.  Echo at that time was normal.  He had a calcium score of 0 in 2019.  And he is now here for follow-up    He has in general been doing pretty well he is gained a lot of weight and he says everyone in his family is pretty heavy he has not had any palpitations he does not feel these PVCs at all no shortness of breath no PND no orthopnea no syncope there is no family history of sudden death no family history of ectopy.  He has been tired but has been working really hard.  He exercises without limitation he says      Past Medical History:   Diagnosis Date    GERD (gastroesophageal reflux disease)     History of kidney stones     Hyperlipemia     Hypertension     PVC (premature ventricular contraction)     HISTORY OF. STATES THROWS A LOT. WORKED UP     Right ankle pain     Seasonal allergies     Sleep apnea     CPAP       Past Surgical History:   Procedure Laterality Date    ADENOIDECTOMY      ANKLE LIGAMENT RECONSTRUCTION Right 2021    Procedure: RIGHT POSTERIOR TIBIAL TENDON DEBRIDEMENT FLEXOR DIGITORUM LONGUS TRANSFER GLADYS/NIKITA OSTEOTOMIES MEDIAL DISPLACEMENT CALCANEAL OSTEOTOMY ACHILLES LENGTHENING( 2HRS);  Surgeon: Ki Bess Jr., MD;  Location: [facility] OR [facility];  Service: Orthopedics;  Laterality: Right;    COLONOSCOPY N/A 2022    Procedure: COLONOSCOPY  to cecum with hot snare/cold bx polypectomies;  Surgeon: Luis Eduardo Hudson MD;  Location: [facility] ENDOSCOPY;  Service: General;  Laterality: N/A;  pre: screening  post: diverticulosis, polyps    ENDOSCOPY N/A 2022     Pt in IR room 1 for procedure. Procedure: ESOPHAGOGASTRODUODENOSCOPY;  Surgeon: Luis Eduardo Hudson MD;  Location: [facility] ENDOSCOPY;  Service: General;  Laterality: N/A;  pre:  GERD  post: sliding Hiatal hernia    ENDOVENOUS ABLATION SAPHENOUS VEIN W/ LASER Left 12/2019    KNEE ARTHROSCOPY Right     PLANTAR FASCIA SURGERY  06/2020    right heel     SEPTOPLASTY      TONSILLECTOMY         Social History     Socioeconomic History    Marital status:    Tobacco Use    Smoking status: Never    Smokeless tobacco: Never    Tobacco comments:     Caffeine use   Vaping Use    Vaping Use: Never used   Substance and Sexual Activity    Alcohol use: Yes     Alcohol/week: 1.0 - 2.0 standard drink of alcohol     Types: 1 - 2 Standard drinks or equivalent per week     Comment: ON OCC    Drug use: No    Sexual activity: Defer       Family History   Problem Relation Age of Onset    Arthritis Mother     Diverticulitis Mother     Hypertension Father     Coronary artery disease Father         med managment    Malig Hyperthermia Neg Hx        Review of Systems   Constitutional: Negative for decreased appetite, fever, malaise/fatigue and weight loss.   HENT:  Negative for nosebleeds.    Eyes:  Negative for double vision.   Cardiovascular:  Negative for chest pain, claudication, cyanosis, dyspnea on exertion, irregular heartbeat, leg swelling, near-syncope, orthopnea, palpitations, paroxysmal nocturnal dyspnea and syncope.   Respiratory:  Negative for cough, hemoptysis and shortness of breath.    Hematologic/Lymphatic: Negative for bleeding problem.   Skin:  Negative for rash.   Musculoskeletal:  Negative for falls and myalgias.   Gastrointestinal:  Negative for hematochezia, jaundice, melena, nausea and vomiting.   Genitourinary:  Negative for hematuria.   Neurological:  Negative for dizziness and seizures.   Psychiatric/Behavioral:  Negative for altered mental status and memory loss.        No Known Allergies      Current Outpatient Medications:     aspirin EC  "(aspirin) 325 MG tablet, Take 1 tablet by mouth Daily., Disp: 30 tablet, Rfl: 0    azelastine (OPTIVAR) 0.05 % ophthalmic solution, Administer 1 drop to both eyes Every Evening., Disp: , Rfl:     cetirizine (zyrTEC) 10 MG tablet, Take 1 tablet by mouth Daily As Needed for Allergies., Disp: , Rfl:     lisinopril (PRINIVIL,ZESTRIL) 10 MG tablet, Take 1 tablet by mouth Daily., Disp: 90 tablet, Rfl: 1    Multiple Vitamins-Minerals (MULTIVITAL PO), Take 1 tablet by mouth Daily., Disp: , Rfl:     omeprazole (priLOSEC) 20 MG capsule, Take 1 capsule by mouth Daily., Disp: , Rfl:     anastrozole (ARIMIDEX) 1 MG tablet, , Disp: , Rfl:     Insulin Pen Needle (Pen Needles) 32G X 4 MM misc, Inject 1 pen under the skin into the appropriate area as directed Daily. Use with victoza pen daily, Disp: 100 each, Rfl: 2    Tirzepatide 7.5 MG/0.5ML solution pen-injector, Inject 7.5 mg under the skin into the appropriate area as directed Every 7 (Seven) Days., Disp: 2 mL, Rfl: 0      Objective:     Vitals:    12/26/23 1244   BP: 136/80   BP Location: Left arm   Patient Position: Sitting   Pulse: 73   Weight: 114 kg (252 lb 6.4 oz)   Height: 177.8 cm (70\")     Body mass index is 36.22 kg/m².    Physical Exam  Constitutional:       Appearance: He is well-developed.   HENT:      Head: Normocephalic.   Eyes:      General: No scleral icterus.  Neck:      Thyroid: No thyromegaly.      Vascular: No JVD.   Cardiovascular:      Rate and Rhythm: Normal rate and regular rhythm.      Heart sounds: Normal heart sounds. No murmur heard.     No friction rub. No gallop.   Pulmonary:      Effort: Pulmonary effort is normal.      Breath sounds: Normal breath sounds. No wheezing or rales.   Abdominal:      Palpations: Abdomen is soft.      Tenderness: There is no abdominal tenderness.   Musculoskeletal:         General: Normal range of motion.   Lymphadenopathy:      Cervical: No cervical adenopathy.   Skin:     General: Skin is warm and dry.      Findings: " No rash.   Neurological:      Mental Status: He is alert and oriented to person, place, and time.           ECG 12 Lead    Date/Time: 12/26/2023 1:22 PM  Performed by: Jose C Anthony MD    Authorized by: Jose C Anthony MD  Comparison: compared with previous ECG   Similar to previous ECG  Rhythm: sinus rhythm  Ectopy: unifocal PVCs    Clinical impression: abnormal EKG        Assessment:       Diagnosis Plan   1. PVC (premature ventricular contraction)  Treadmill Stress Test    Holter Monitor - 24 Hour               Plan:       Well I do not see any big change with him he looks a little tired which is always concerning.  And he is probably still has a lot of ectopy I have talked with the arrhythmia team they but they would reestablish another Holter and see how much ectopy he is having.  Which we will do but I feel like we should have him walk on a treadmill and just make sure he is okay on that.  I am going to not make any other changes in his meds.  He would be a shannan that when semaglutide gets approved for obesity I think he might do well with that it is possible the PVCs could be obesity related the arrhythmia team thought that it was going to be near his aortic cusp but without any symptoms or LV dysfunction probably not somebody to ablate.    As always, it has been a pleasure to participate in your patient's care.      Sincerely,       Jose C Anthony MD

## 2024-01-15 ENCOUNTER — HOSPITAL ENCOUNTER (OUTPATIENT)
Dept: CARDIOLOGY | Facility: HOSPITAL | Age: 59
Discharge: HOME | End: 2024-01-15
Admitting: INTERNAL MEDICINE
Payer: COMMERCIAL

## 2024-01-15 DIAGNOSIS — I49.3 PVC (PREMATURE VENTRICULAR CONTRACTION): ICD-10-CM

## 2024-01-15 LAB
BH CV STRESS BP STAGE 1: NORMAL
BH CV STRESS BP STAGE 2: NORMAL
BH CV STRESS BP STAGE 3: NORMAL
BH CV STRESS DURATION MIN STAGE 1: 3
BH CV STRESS DURATION MIN STAGE 2: 3
BH CV STRESS DURATION MIN STAGE 3: 3
BH CV STRESS DURATION SEC STAGE 1: 0
BH CV STRESS DURATION SEC STAGE 2: 0
BH CV STRESS DURATION SEC STAGE 3: 0
BH CV STRESS GRADE STAGE 1: 10
BH CV STRESS GRADE STAGE 2: 12
BH CV STRESS GRADE STAGE 3: 14
BH CV STRESS HR STAGE 1: 98
BH CV STRESS HR STAGE 2: 118
BH CV STRESS HR STAGE 3: 150
BH CV STRESS METS STAGE 1: 5
BH CV STRESS METS STAGE 2: 7.5
BH CV STRESS METS STAGE 3: 10
BH CV STRESS PROTOCOL 1: NORMAL
BH CV STRESS RECOVERY BP: NORMAL MMHG
BH CV STRESS RECOVERY HR: 95 BPM
BH CV STRESS SPEED STAGE 1: 1.7
BH CV STRESS SPEED STAGE 2: 2.5
BH CV STRESS SPEED STAGE 3: 3.4
BH CV STRESS STAGE 1: 1
BH CV STRESS STAGE 2: 2
BH CV STRESS STAGE 3: 3
MAXIMAL PREDICTED HEART RATE: 163 BPM
PERCENT MAX PREDICTED HR: 92.02 %
STRESS BASELINE BP: NORMAL MMHG
STRESS BASELINE HR: 76 BPM
STRESS PERCENT HR: 108 %
STRESS POST ESTIMATED WORKLOAD: 10 METS
STRESS POST EXERCISE DUR MIN: 9 MIN
STRESS POST EXERCISE DUR SEC: 0 SEC
STRESS POST PEAK BP: NORMAL MMHG
STRESS POST PEAK HR: 150 BPM
STRESS TARGET HR: 139 BPM

## 2024-01-15 PROCEDURE — 93017 CV STRESS TEST TRACING ONLY: CPT

## 2024-03-18 ENCOUNTER — OFFICE VISIT (OUTPATIENT)
Dept: FAMILY MEDICINE CLINIC | Facility: CLINIC | Age: 59
End: 2024-03-18
Payer: COMMERCIAL

## 2024-03-18 VITALS
HEIGHT: 70 IN | OXYGEN SATURATION: 90 % | HEART RATE: 90 BPM | TEMPERATURE: 98.3 F | BODY MASS INDEX: 36.09 KG/M2 | DIASTOLIC BLOOD PRESSURE: 107 MMHG | SYSTOLIC BLOOD PRESSURE: 151 MMHG | WEIGHT: 252.1 LBS

## 2024-03-18 DIAGNOSIS — Z00.00 HEALTH CARE MAINTENANCE: Primary | ICD-10-CM

## 2024-03-18 DIAGNOSIS — Z12.5 SCREENING PSA (PROSTATE SPECIFIC ANTIGEN): ICD-10-CM

## 2024-03-18 DIAGNOSIS — Z12.11 ENCOUNTER FOR SCREENING COLONOSCOPY: ICD-10-CM

## 2024-03-18 DIAGNOSIS — I10 ESSENTIAL HYPERTENSION: ICD-10-CM

## 2024-03-18 DIAGNOSIS — E66.9 CLASS 2 OBESITY: ICD-10-CM

## 2024-03-18 PROBLEM — G47.33 OSA (OBSTRUCTIVE SLEEP APNEA): Status: ACTIVE | Noted: 2024-03-18

## 2024-03-18 PROBLEM — E66.812 CLASS 2 OBESITY: Status: ACTIVE | Noted: 2024-03-18

## 2024-03-18 PROBLEM — E66.01 MORBID (SEVERE) OBESITY DUE TO EXCESS CALORIES: Status: RESOLVED | Noted: 2022-08-22 | Resolved: 2024-03-18

## 2024-03-18 RX ORDER — LISINOPRIL 10 MG/1
10 TABLET ORAL DAILY
Qty: 90 TABLET | Refills: 1 | Status: SHIPPED | OUTPATIENT
Start: 2024-03-18

## 2024-03-18 NOTE — PROGRESS NOTES
"Chief Complaint  Annual Exam (Physical Exam )    Subjective        Jose Montanez presents to [facility] PRIMARY [facility]ARE  History of Present Illness    Annual healthcare maintenance visit.  Non-smoker.  No heavy alcohol use.  Is exercising a few times a week with a  and doing a great job.      Class II obesity with a BMI of 36.  Previously did well with tirzepatide/Mounjaro.  He is interested in restarting it.  He was at 1 point on anastrozole and testosterone but he is off of that.  He was going to a retail clinic.  He has no personal or family history of medullary thyroid carcinoma or multiple endocrine neoplasia.    I reviewed his lab work from about 6 months ago.  Glucose normal.  Mildly elevated LDL.  PSA normal.  He has a urologist he goes to for yearly prostate exams.    Hypertension.  He has not taken his lisinopril in a couple weeks.  He ran out.  Blood pressure is elevated today.  No symptoms.    Objective   Vital Signs:  BP (!) 151/107 (BP Location: Left arm, Patient Position: Sitting, Cuff Size: Large Adult)   Pulse 90   Temp 98.3 °F (36.8 °C)   Ht 177.8 cm (70\")   Wt 114 kg (252 lb 1.6 oz)   SpO2 90%   BMI 36.17 kg/m²   Estimated body mass index is 36.17 kg/m² as calculated from the following:    Height as of this encounter: 177.8 cm (70\").    Weight as of this encounter: 114 kg (252 lb 1.6 oz).             Physical Exam  Constitutional:       Appearance: Normal appearance.   HENT:      Head: Atraumatic.      Right Ear: Tympanic membrane and ear canal normal.      Left Ear: Tympanic membrane and ear canal normal.      Mouth/Throat:      Pharynx: Oropharynx is clear. No oropharyngeal exudate or posterior oropharyngeal erythema.   Eyes:      Conjunctiva/sclera: Conjunctivae normal.   Neck:      Thyroid: No thyroid mass, thyromegaly or thyroid tenderness.   Cardiovascular:      Rate and Rhythm: Normal rate and regular rhythm.      Pulses: Normal pulses.      Heart " sounds: Normal heart sounds.   Pulmonary:      Effort: Pulmonary effort is normal.      Breath sounds: Normal breath sounds.   Abdominal:      General: Abdomen is flat. There is no distension.      Palpations: Abdomen is soft. There is no mass.      Tenderness: There is no abdominal tenderness.      Hernia: No hernia is present.   Musculoskeletal:         General: Normal range of motion.      Cervical back: Normal range of motion and neck supple. No muscular tenderness.   Lymphadenopathy:      Cervical: No cervical adenopathy.   Skin:     General: Skin is warm and dry.      Findings: No rash.   Neurological:      General: No focal deficit present.      Mental Status: He is alert and oriented to person, place, and time.   Psychiatric:         Mood and Affect: Mood normal.        Result Review :    The following data was reviewed by: Enrico Santizo MD on 03/18/2024:  Common labs          8/23/2023    09:58   Common Labs   Glucose 94    BUN 18    Creatinine 1.28    Sodium 140    Potassium 4.4    Chloride 104    Calcium 9.3    Albumin 4.4    Total Bilirubin 1.1    Alkaline Phosphatase 74    AST (SGOT) 24    ALT (SGPT) 23    WBC 5.37    Hemoglobin 15.7    Hematocrit 47.0    Platelets 124    Total Cholesterol 197    Triglycerides 58    HDL Cholesterol 48    LDL Cholesterol  138    Hemoglobin A1C 5.20    PSA 1.920                   Assessment and Plan     Diagnoses and all orders for this visit:    1. Health care maintenance (Primary)    2. Essential hypertension    3. Class 2 obesity    4. Encounter for screening colonoscopy  -     Ambulatory Referral For Screening Colonoscopy    Other orders  -     Tirzepatide-Weight Management (ZEPBOUND) 2.5 MG/0.5ML solution auto-injector; Inject 0.5 mL under the skin into the appropriate area as directed 1 (One) Time Per Week.  Dispense: 2 mL; Refill: 5  -     lisinopril (PRINIVIL,ZESTRIL) 10 MG tablet; Take 1 tablet by mouth Daily.  Dispense: 90 tablet; Refill: 1      Annual  healthcare maintenance visit.    Immunizations.  I recommend shingles vaccine up-to-date COVID booster.    Hypertension.  Needs to restart his lisinopril.  I have given him both verbal and written instructions on how to check blood pressure at home and send results.  I will see him back in 1 year assuming his blood pressure looks good at home.    Class II obesity.  BMI 36.  Good candidate for tirzepatide weight management.  Sending 2.5 mg to his pharmacy.  I will see him back as needed.  Will let me know on week 3 how he is doing with that.  Side effects including GI disturbance discussed.  We discussed FDA warnings with regards to thyroid cancer risk in  rats, relevance of human beings unknown.  Benefits definitely outweigh risks in my opinion.    Mild dyslipidemia.  He was also having trouble with palpitations and went to a cardiologist.  Stress test was reportedly unremarkable.  He had a CT CAC score of 0 in 2019.  At this time holding off statin recommendation.  Will check lipid panel prior to next visit.    Colon cancer screening.  He is up-to-date.  Referral placed for colonoscopy.    Prostate cancer screening through his urologist.    Other preventative health practices discussed including alcohol use counseling and regular exercise.             Follow Up     No follow-ups on file.  Patient was given instructions and counseling regarding his condition or for health maintenance advice. Please see specific information pulled into the AVS if appropriate.

## 2024-07-29 ENCOUNTER — TELEPHONE (OUTPATIENT)
Dept: FAMILY MEDICINE CLINIC | Facility: CLINIC | Age: 59
End: 2024-07-29

## 2024-07-29 NOTE — TELEPHONE ENCOUNTER
Hub staff attempted to follow warm transfer process and was unsuccessful     Caller: Jose Montanez    Relationship to patient: Self    Best call back number: [phone]     Patient is needing: SCHEDULE LABS

## 2024-07-30 ENCOUNTER — TELEPHONE (OUTPATIENT)
Dept: FAMILY MEDICINE CLINIC | Facility: CLINIC | Age: 59
End: 2024-07-30
Payer: COMMERCIAL

## 2024-07-30 NOTE — TELEPHONE ENCOUNTER
Returned call to pt to inform him that per Dr. Santizo he is ok to wait to be seen in march 2025 if BP if ok.

## 2024-10-12 DIAGNOSIS — J04.0 LARYNGITIS, ACUTE: Primary | ICD-10-CM

## 2024-10-12 RX ORDER — METHYLPREDNISOLONE 4 MG
TABLET, DOSE PACK ORAL
Qty: 21 TABLET | Refills: 0 | Status: SHIPPED | OUTPATIENT
Start: 2024-10-12

## 2024-10-12 RX ORDER — TOBRAMYCIN 3 MG/ML
1 SOLUTION/ DROPS OPHTHALMIC
Qty: 5 ML | Refills: 0 | Status: SHIPPED | OUTPATIENT
Start: 2024-10-12 | End: 2024-10-24

## 2024-10-12 RX ORDER — CLARITHROMYCIN 500 MG
500 TABLET ORAL 2 TIMES DAILY
Qty: 28 TABLET | Refills: 0 | Status: SHIPPED | OUTPATIENT
Start: 2024-10-12

## 2025-06-25 DIAGNOSIS — I10 PRIMARY HYPERTENSION: Primary | ICD-10-CM

## 2025-06-25 RX ORDER — LISINOPRIL 10 MG/1
10 TABLET ORAL DAILY
Qty: 90 TABLET | Refills: 3 | Status: SHIPPED | OUTPATIENT
Start: 2025-06-25 | End: 2026-06-20

## (undated) DEVICE — UNDERCAST PADDING: Brand: DEROYAL

## (undated) DEVICE — THIN OFFSET (13.0 X 0.38 X 39.0MM)

## (undated) DEVICE — SPNG GZ WOVN 4X4IN 12PLY 10/BX STRL

## (undated) DEVICE — IMPLANTABLE DEVICE
Type: IMPLANTABLE DEVICE | Site: ANKLE | Status: NON-FUNCTIONAL
Removed: 2021-06-14

## (undated) DEVICE — DRP C/ARMOR

## (undated) DEVICE — PAD,ABDOMINAL,8"X10",ST,LF: Brand: MEDLINE

## (undated) DEVICE — PROXIMATE RH ROTATING HEAD SKIN STAPLERS (35 WIDE) CONTAINS 35 STAINLESS STEEL STAPLES: Brand: PROXIMATE

## (undated) DEVICE — GLV SURG SIGNATURE ESSENTIAL PF LTX SZ8

## (undated) DEVICE — SUT VIC 2/0 CT2 27IN J269H

## (undated) DEVICE — PENCL E/S ULTRAVAC TELESCP NOSE HOLSTR 10FT

## (undated) DEVICE — ADAPT CLN BIOGUARD AIR/H2O DISP

## (undated) DEVICE — FRCP BX RADJAW4 NDL 2.8 240CM LG OG BX40

## (undated) DEVICE — SUT ETHLN 3/0 PS1 18IN 1663H

## (undated) DEVICE — DRAPE,U/ SHT,SPLIT,PLAS,STERIL: Brand: MEDLINE

## (undated) DEVICE — PK ORTHO MINOR TOWER 40

## (undated) DEVICE — INTENDED FOR TISSUE SEPARATION, AND OTHER PROCEDURES THAT REQUIRE A SHARP SURGICAL BLADE TO PUNCTURE OR CUT.: Brand: BARD-PARKER ® CARBON RIB-BACK BLADES

## (undated) DEVICE — KT ORCA ORCAPOD DISP STRL

## (undated) DEVICE — SPLNT PLSTR ORTHO 5IN

## (undated) DEVICE — PRECISION THIN (9.0 X 0.38 X 25.0MM)

## (undated) DEVICE — TRAP FLD MINIVAC MEGADYNE 100ML

## (undated) DEVICE — GLV SURG BIOGEL LTX PF 8

## (undated) DEVICE — STCKNT IMPERV 9X36IN STRL

## (undated) DEVICE — THE SINGLE USE ETRAP – POLYP TRAP IS USED FOR SUCTION RETRIEVAL OF ENDOSCOPICALLY REMOVED POLYPS.: Brand: ETRAP

## (undated) DEVICE — PATIENT RETURN ELECTRODE, SINGLE-USE, CONTACT QUALITY MONITORING, ADULT, WITH 9FT CORD, FOR PATIENTS WEIGING OVER 33LBS. (15KG): Brand: MEGADYNE

## (undated) DEVICE — DISPOSABLE TOURNIQUET CUFF SINGLE BLADDER, SINGLE PORT AND QUICK CONNECT CONNECTOR: Brand: COLOR CUFF

## (undated) DEVICE — DRSNG GZ CURAD XEROFORM NONADHS 5X9IN STRL

## (undated) DEVICE — BITEBLOCK OMNI BLOC

## (undated) DEVICE — BNDG ELAS ELITE V/CLOSE 6IN 5YD LF STRL

## (undated) DEVICE — SUT VIC 3/0 CT2 27IN J232H

## (undated) DEVICE — Device

## (undated) DEVICE — SNAR POLYP SENSATION STDOVL 27 240 BX40

## (undated) DEVICE — TUBING, SUCTION, 1/4" X 10', STRAIGHT: Brand: MEDLINE

## (undated) DEVICE — GOWN,REINF,POLY,SIRUS,BREATH SLV,XLNG/XL: Brand: MEDLINE

## (undated) DEVICE — SOL ISO/ALC RUB 70PCT 4OZ

## (undated) DEVICE — UNDYED BRAIDED (POLYGLACTIN 910), SYNTHETIC ABSORBABLE SUTURE: Brand: COATED VICRYL

## (undated) DEVICE — LN SMPL CO2 SHTRM SD STREAM W/M LUER

## (undated) DEVICE — SENSR O2 OXIMAX FNGR A/ 18IN NONSTR

## (undated) DEVICE — CANN O2 ETCO2 FITS ALL CONN CO2 SMPL A/ 7IN DISP LF

## (undated) DEVICE — GLV SURG PREMIERPRO ORTHO LTX PF SZ8 BRN